# Patient Record
Sex: MALE | Race: WHITE | Employment: STUDENT | URBAN - METROPOLITAN AREA
[De-identification: names, ages, dates, MRNs, and addresses within clinical notes are randomized per-mention and may not be internally consistent; named-entity substitution may affect disease eponyms.]

---

## 2017-01-09 NOTE — PROGRESS NOTES
Vasyl Negrete is a 24year old male. HPI:   Patient presents for recheck of his ADHD. Patient has been using the stimulant medication, Adderall XR 10mg, on a regular basis. Patient was last seen 1 year ago.   Medication changes at that time:  Non shortness of breath with exertion  CARDIOVASCULAR: denies chest pain, denies palpitations  GI: denies abdominal pain  NEURO: denies headaches    EXAM:   /62 mmHg  Pulse 72  Temp(Src) 97.9 °F (36.6 °C) (Oral)  Resp 16  Ht 71\"  Wt 167 lb 12.8 oz  BMI

## 2017-04-17 PROBLEM — S92.354A CLOSED NONDISPLACED FRACTURE OF FIFTH METATARSAL BONE OF RIGHT FOOT, INITIAL ENCOUNTER: Status: ACTIVE | Noted: 2017-04-17

## 2017-05-22 ENCOUNTER — APPOINTMENT (OUTPATIENT)
Dept: PHYSICAL THERAPY | Facility: HOSPITAL | Age: 22
End: 2017-05-22
Attending: ORTHOPAEDIC SURGERY
Payer: COMMERCIAL

## 2017-05-24 ENCOUNTER — HOSPITAL ENCOUNTER (OUTPATIENT)
Dept: PHYSICAL THERAPY | Facility: HOSPITAL | Age: 22
Setting detail: THERAPIES SERIES
Discharge: HOME | End: 2017-05-24
Attending: ORTHOPAEDIC SURGERY
Payer: COMMERCIAL

## 2017-05-24 DIAGNOSIS — S92.354A CLOSED NONDISPLACED FRACTURE OF FIFTH RIGHT METATARSAL BONE: Primary | ICD-10-CM

## 2017-05-24 PROCEDURE — 97110 THERAPEUTIC EXERCISES: CPT

## 2017-05-24 PROCEDURE — 97140 MANUAL THERAPY 1/> REGIONS: CPT

## 2017-05-24 PROCEDURE — 97161 PT EVAL LOW COMPLEX 20 MIN: CPT

## 2017-05-24 NOTE — PROGRESS NOTES
LOWER EXTREMITY EVALUATION:   Referring Physician: Dr. Neri Pac  Diagnosis: R nondisplaced fracture of fifth metatarsal bone Date of Service: 5/24/2017     PATIENT SUMMARY   Esperanza Bruce is a 24year old y/o male who presents to therapy today with com weakness, muscle atrophy R calf, antalgic gait pattern, limping on R LE, impaired balance, and restricted R LE flexibility.    Aleah Nanas would benefit from skilled Physical Therapy to address the above impairments to overall functional mobility, strength, an 3.4, M/L 1.8; L overall stability 4.7, A/P 3.1, M/L 3.0; (normative data: overall stability 3.9, A/P 3.7, and M/L 1.8    Today’s Treatment and Response: Evaluation complete. Education of objective findings. Discussed plan of care. Issued HEP.     Evaluation care.    Thank you for your referral. Please co-sign or sign and return this letter via fax as soon as possible to [phone].  If you have any questions, please contact me at Dept: [phone]    Sincerely,  Electronically signed by therapist: Boby Meneses

## 2017-05-30 ENCOUNTER — HOSPITAL ENCOUNTER (OUTPATIENT)
Dept: PHYSICAL THERAPY | Facility: HOSPITAL | Age: 22
Setting detail: THERAPIES SERIES
Discharge: HOME | End: 2017-05-30
Attending: ORTHOPAEDIC SURGERY
Payer: COMMERCIAL

## 2017-05-30 PROCEDURE — 97110 THERAPEUTIC EXERCISES: CPT

## 2017-05-30 PROCEDURE — 97140 MANUAL THERAPY 1/> REGIONS: CPT

## 2017-05-30 NOTE — PROGRESS NOTES
Dx: R closed nondisplaced fracture of 5th metarsal         Authorized # of Visits:  8 visits         Next MD visit: none scheduled  Fall Risk: standard         Precautions: n/a             Subjective: Pt. Reports currently 0/10 pain in R foot.  Has been doi trunk rotation x 10         Foam beam   Tandem walk x 5  Side step x 5         Shuttle   Squat 4C x 30  R 3C x 15  B heel raises 3C x 30         sls with ski poles trunk rotation         MT to R forefoot and midfoot           Ice x 10 min         Bold exer

## 2017-06-05 ENCOUNTER — HOSPITAL ENCOUNTER (OUTPATIENT)
Dept: PHYSICAL THERAPY | Facility: HOSPITAL | Age: 22
Setting detail: THERAPIES SERIES
Discharge: HOME | End: 2017-06-05
Attending: ORTHOPAEDIC SURGERY
Payer: COMMERCIAL

## 2017-06-05 PROCEDURE — 97110 THERAPEUTIC EXERCISES: CPT

## 2017-06-05 PROCEDURE — 97140 MANUAL THERAPY 1/> REGIONS: CPT

## 2017-06-05 NOTE — PROGRESS NOTES
Dx: R closed nondisplaced fracture of 5th metarsal         Authorized # of Visits:  8 visits         Next MD visit: none scheduled  Fall Risk: standard         Precautions: n/a             Subjective: Pt. Reports currently 0/10 pain in R foot.  States he no Date:   Tx#: 5/ Date: Tx#: 6/ Date: Tx#: 7/ Date:    Tx#: 8/   Bike x 5 min Bike x 5 min        A/P board x 20 A/P board x 20  M/L x 20        White board gastroc stretch 10 sec x 10 White board gastroc stretch 10 sec x 10        prostretch 10 sec x 10

## 2017-06-07 ENCOUNTER — HOSPITAL ENCOUNTER (OUTPATIENT)
Dept: PHYSICAL THERAPY | Facility: HOSPITAL | Age: 22
Setting detail: THERAPIES SERIES
Discharge: HOME | End: 2017-06-07
Attending: ORTHOPAEDIC SURGERY
Payer: COMMERCIAL

## 2017-06-07 PROCEDURE — 97140 MANUAL THERAPY 1/> REGIONS: CPT

## 2017-06-07 PROCEDURE — 97110 THERAPEUTIC EXERCISES: CPT

## 2017-06-07 NOTE — PROGRESS NOTES
Dx: R closed nondisplaced fracture of 5th metarsal         Authorized # of Visits:  8 visits         Next MD visit: none scheduled  Fall Risk: standard         Precautions: n/a             Subjective: Pt. Reports currently 0/10 pain in R foot.  Ingrown toe 20  M/L x 20 A/P board x 20   M/L x 20       White board gastroc stretch 10 sec x 10 White board gastroc stretch 10 sec x 10 White board gastroc stretch 10 sec x 10       prostretch 10 sec x 10 prostretch 10 sec x 10 prostretch 10 sec x 10       bosu ball

## 2017-06-12 ENCOUNTER — APPOINTMENT (OUTPATIENT)
Dept: PHYSICAL THERAPY | Facility: HOSPITAL | Age: 22
End: 2017-06-12
Attending: ORTHOPAEDIC SURGERY
Payer: COMMERCIAL

## 2017-06-14 ENCOUNTER — APPOINTMENT (OUTPATIENT)
Dept: PHYSICAL THERAPY | Facility: HOSPITAL | Age: 22
End: 2017-06-14
Attending: ORTHOPAEDIC SURGERY
Payer: COMMERCIAL

## 2017-08-11 ENCOUNTER — OFFICE VISIT (OUTPATIENT)
Dept: FAMILY MEDICINE CLINIC | Facility: CLINIC | Age: 22
End: 2017-08-11

## 2017-08-11 VITALS
HEIGHT: 71.5 IN | HEART RATE: 72 BPM | SYSTOLIC BLOOD PRESSURE: 100 MMHG | DIASTOLIC BLOOD PRESSURE: 60 MMHG | WEIGHT: 171 LBS | BODY MASS INDEX: 23.42 KG/M2 | TEMPERATURE: 98 F

## 2017-08-11 DIAGNOSIS — Z00.00 ANNUAL PHYSICAL EXAM: Primary | ICD-10-CM

## 2017-08-11 DIAGNOSIS — F90.0 ATTENTION DEFICIT HYPERACTIVITY DISORDER (ADHD), PREDOMINANTLY INATTENTIVE TYPE: ICD-10-CM

## 2017-08-11 PROCEDURE — 99395 PREV VISIT EST AGE 18-39: CPT | Performed by: FAMILY MEDICINE

## 2017-08-11 RX ORDER — DEXTROAMPHETAMINE SACCHARATE, AMPHETAMINE ASPARTATE MONOHYDRATE, DEXTROAMPHETAMINE SULFATE AND AMPHETAMINE SULFATE 2.5; 2.5; 2.5; 2.5 MG/1; MG/1; MG/1; MG/1
10 CAPSULE, EXTENDED RELEASE ORAL DAILY
Qty: 30 CAPSULE | Refills: 0 | Status: SHIPPED | OUTPATIENT
Start: 2017-09-10 | End: 2017-10-10

## 2017-08-11 RX ORDER — DEXTROAMPHETAMINE SACCHARATE, AMPHETAMINE ASPARTATE MONOHYDRATE, DEXTROAMPHETAMINE SULFATE AND AMPHETAMINE SULFATE 2.5; 2.5; 2.5; 2.5 MG/1; MG/1; MG/1; MG/1
10 CAPSULE, EXTENDED RELEASE ORAL DAILY
Qty: 30 CAPSULE | Refills: 0 | Status: SHIPPED | OUTPATIENT
Start: 2017-10-10 | End: 2017-11-09

## 2017-08-11 RX ORDER — DEXTROAMPHETAMINE SACCHARATE, AMPHETAMINE ASPARTATE MONOHYDRATE, DEXTROAMPHETAMINE SULFATE AND AMPHETAMINE SULFATE 2.5; 2.5; 2.5; 2.5 MG/1; MG/1; MG/1; MG/1
10 CAPSULE, EXTENDED RELEASE ORAL DAILY
Qty: 30 CAPSULE | Refills: 0 | Status: SHIPPED | OUTPATIENT
Start: 2017-08-11 | End: 2017-09-10

## 2017-08-11 NOTE — PROGRESS NOTES
Patient presents with:  ADD: med check refill on adderall     HPI:   Ector Najjar is a 25year old male who presents for a complete physical exam.     Last colonoscopy:  n/a  Last PSA:  n/a  Immunizations: TDaP 6/2014.       School at [employer] Diabetes Maternal Grandmother       Social History:  Smoking status: Never Smoker                                                              Smokeless tobacco: Former User                     Alcohol use: Yes           1.8 - 2.4 oz/week     Standard drin (ADDERALL XR) 10 MG Oral Capsule SR 24 Hr; Take 1 capsule (10 mg total) by mouth daily. Dispense: 30 capsule; Refill: 0  - Amphetamine-Dextroamphet ER (ADDERALL XR) 10 MG Oral Capsule SR 24 Hr; Take 1 capsule (10 mg total) by mouth daily.   Dispense: 30 ca

## 2017-09-19 ENCOUNTER — TELEPHONE (OUTPATIENT)
Dept: FAMILY MEDICINE CLINIC | Facility: CLINIC | Age: 22
End: 2017-09-19

## 2017-09-19 NOTE — TELEPHONE ENCOUNTER
Patient is calling he needs a note for the school on why he is prescribed Adderrall. He said it is because it affects his school work. He needs letter today or tomorrow. Will call back with a fax number to fax it too.

## 2017-11-22 ENCOUNTER — TELEPHONE (OUTPATIENT)
Dept: FAMILY MEDICINE CLINIC | Facility: CLINIC | Age: 22
End: 2017-11-22

## 2017-11-22 NOTE — TELEPHONE ENCOUNTER
Patient's mom states adderall prescription  needs at least one medication sent to [address].

## 2017-12-20 NOTE — PROGRESS NOTES
Patient presents with:  ADD: refill on Adderall     HPI:   Leslie Vega is a 25year old male who presents for recheck of his ADHD. Pt is taking Adderall XR 10mg. This dose is effective  Takes historically 2-3 times a week.     Not experiencing any Oral Capsule SR 24 Hr; Take 1 capsule (10 mg total) by mouth daily. Dispense: 30 capsule; Refill: 0  - Amphetamine-Dextroamphet ER (ADDERALL XR) 10 MG Oral Capsule SR 24 Hr; Take 1 capsule (10 mg total) by mouth daily. Dispense: 30 capsule;  Refill: 0  -

## 2018-12-27 ENCOUNTER — APPOINTMENT (OUTPATIENT)
Dept: CT IMAGING | Facility: HOSPITAL | Age: 23
End: 2018-12-27
Attending: EMERGENCY MEDICINE

## 2018-12-27 ENCOUNTER — HOSPITAL ENCOUNTER (EMERGENCY)
Facility: HOSPITAL | Age: 23
Discharge: HOME | End: 2018-12-27
Attending: EMERGENCY MEDICINE

## 2018-12-27 VITALS
HEIGHT: 71 IN | OXYGEN SATURATION: 98 % | RESPIRATION RATE: 18 BRPM | WEIGHT: 180 LBS | DIASTOLIC BLOOD PRESSURE: 63 MMHG | HEART RATE: 61 BPM | SYSTOLIC BLOOD PRESSURE: 120 MMHG | BODY MASS INDEX: 25.2 KG/M2 | TEMPERATURE: 98 F

## 2018-12-27 DIAGNOSIS — R10.9 FLANK PAIN: Primary | ICD-10-CM

## 2018-12-27 DIAGNOSIS — N20.0 KIDNEY STONE: ICD-10-CM

## 2018-12-27 PROCEDURE — 85025 COMPLETE CBC W/AUTO DIFF WBC: CPT | Performed by: EMERGENCY MEDICINE

## 2018-12-27 PROCEDURE — 36415 COLL VENOUS BLD VENIPUNCTURE: CPT

## 2018-12-27 PROCEDURE — 80053 COMPREHEN METABOLIC PANEL: CPT | Performed by: EMERGENCY MEDICINE

## 2018-12-27 PROCEDURE — 81003 URINALYSIS AUTO W/O SCOPE: CPT | Performed by: EMERGENCY MEDICINE

## 2018-12-27 PROCEDURE — 74176 CT ABD & PELVIS W/O CONTRAST: CPT | Performed by: EMERGENCY MEDICINE

## 2018-12-27 PROCEDURE — 99284 EMERGENCY DEPT VISIT MOD MDM: CPT

## 2018-12-28 NOTE — ED INITIAL ASSESSMENT (HPI)
21year old male here with RLQ, middle abdomen pain. Pain started an hour ago after patient ate portillos. Patient stated he felt nauseous, no vomiting. Patient denies fevers, diarrhea.

## 2018-12-28 NOTE — ED PROVIDER NOTES
Patient Seen in: [facility] Emergency Department    History   Patient presents with:  Abdomen/Flank Pain (GI/)    Stated Complaint: abd pain    HPI      19-year-old male presents to the emergency department with acute onset right lower quadrant pa distress  Heent:  Normocephalic, atraumatic. PERRL. EOMI  CVS:  RRR  Lungs:  CTA  Abd:  Soft, nondistended, nontender in RLQ. No guarding or rebound. Ext:  No clubbing,cyanosis, edema  Neuro:  CN 2-12 intact. No focal deficit.     ED Course     Labs Rev Patient had an IV established and labs were drawn. Patient continued to be observed here in the emergency department. Patient remained stable throughout the emergency department observation period. Patient was reassessed and currently pain-free.

## 2019-04-15 ENCOUNTER — TELEPHONE (OUTPATIENT)
Dept: FAMILY MEDICINE CLINIC | Facility: CLINIC | Age: 24
End: 2019-04-15

## 2019-04-15 RX ORDER — DEXTROAMPHETAMINE SACCHARATE, AMPHETAMINE ASPARTATE MONOHYDRATE, DEXTROAMPHETAMINE SULFATE AND AMPHETAMINE SULFATE 2.5; 2.5; 2.5; 2.5 MG/1; MG/1; MG/1; MG/1
10 CAPSULE, EXTENDED RELEASE ORAL DAILY
Qty: 30 CAPSULE | Refills: 0 | Status: SHIPPED | OUTPATIENT
Start: 2019-04-15 | End: 2019-05-15

## 2019-04-15 NOTE — TELEPHONE ENCOUNTER
Called and talked to mother if Dr Sofi Lee is willing to write the script she will pick it up and fill it then overnight it to him at school

## 2019-04-15 NOTE — TELEPHONE ENCOUNTER
Patient is away at school and is in need of his Amphetamine-Dextroamphet ER (Adderral). He is completely out and he is starting finals and will be back in May. Patient scheduled for 1:30 pm on May 17, 2019.  If you need to call Lena Morel' cell [phone]

## 2019-05-16 NOTE — H&P
[facility]    PATIENT'S NAME: Ok Steward   ATTENDING PHYSICIAN: Monica Dent M.D.    PATIENT ACCOUNT#:   [de-identified]    LOCATION:    MEDICAL RECORD #:   [id number]       YOB: 1995  ADMISSION DATE:       05/18/2019    HISTORY A over the extensor tendons at the level of the carpus and metacarpals, with a contracture of the extension of the digits. The carpus was stable. There was no erythema. The patient was diagnosed as having a bone spur.   Dr. Cathy Stringer recommended not only reex thickening of the extensor tendons over the dorsal aspect of left hand and wrist.  Patient has good motion and strength. He is neurovascularly intact. IMPRESSION:  Recurrent tenosynovitis.      PLAN:  Left hand and wrist dorsal tenosynovectomy with poss

## 2019-05-16 NOTE — H&P (VIEW-ONLY)
HISTORY AND PHYSICAL EXAMINATION    DATE: 05/16/2019  [facility]     HISTORY OF PRESENT ILLNESS:  This is a 63-year-old male who is scheduled to undergo a left wrist extensor tenosynovectomy by myself at [facility] on 05/18/2019.  The patient unders of the mass. He felt the patient had a tenosynovitis and recommended release of the first dorsal compartment at the time of surgery. He recommended surgery to be done under Annmarie block. The patient elected not to have the surgery at that time.  The patient release of first dorsal compartment and exploration for a carpal boss under general anesthesia as a same-day surgery patient at [facility] on 05/18/2019. The patient understands the procedure. The patient does wish to proceed.  The patient has no medic

## 2019-05-17 ENCOUNTER — OFFICE VISIT (OUTPATIENT)
Dept: FAMILY MEDICINE CLINIC | Facility: CLINIC | Age: 24
End: 2019-05-17
Payer: COMMERCIAL

## 2019-05-17 VITALS
HEART RATE: 72 BPM | WEIGHT: 171.19 LBS | SYSTOLIC BLOOD PRESSURE: 102 MMHG | TEMPERATURE: 98 F | DIASTOLIC BLOOD PRESSURE: 60 MMHG | BODY MASS INDEX: 23.97 KG/M2 | HEIGHT: 70.75 IN

## 2019-05-17 DIAGNOSIS — F90.0 ATTENTION DEFICIT HYPERACTIVITY DISORDER (ADHD), PREDOMINANTLY INATTENTIVE TYPE: ICD-10-CM

## 2019-05-17 DIAGNOSIS — Z00.00 ANNUAL PHYSICAL EXAM: Primary | ICD-10-CM

## 2019-05-17 PROCEDURE — 99395 PREV VISIT EST AGE 18-39: CPT | Performed by: FAMILY MEDICINE

## 2019-05-17 RX ORDER — DEXTROAMPHETAMINE SACCHARATE, AMPHETAMINE ASPARTATE MONOHYDRATE, DEXTROAMPHETAMINE SULFATE AND AMPHETAMINE SULFATE 2.5; 2.5; 2.5; 2.5 MG/1; MG/1; MG/1; MG/1
10 CAPSULE, EXTENDED RELEASE ORAL DAILY
Qty: 30 CAPSULE | Refills: 0 | Status: SHIPPED | OUTPATIENT
Start: 2019-05-17 | End: 2019-06-16

## 2019-05-17 RX ORDER — DEXTROAMPHETAMINE SACCHARATE, AMPHETAMINE ASPARTATE MONOHYDRATE, DEXTROAMPHETAMINE SULFATE AND AMPHETAMINE SULFATE 2.5; 2.5; 2.5; 2.5 MG/1; MG/1; MG/1; MG/1
10 CAPSULE, EXTENDED RELEASE ORAL DAILY
Qty: 30 CAPSULE | Refills: 0 | Status: SHIPPED | OUTPATIENT
Start: 2019-06-16 | End: 2019-07-16

## 2019-05-17 RX ORDER — DEXTROAMPHETAMINE SACCHARATE, AMPHETAMINE ASPARTATE MONOHYDRATE, DEXTROAMPHETAMINE SULFATE AND AMPHETAMINE SULFATE 2.5; 2.5; 2.5; 2.5 MG/1; MG/1; MG/1; MG/1
10 CAPSULE, EXTENDED RELEASE ORAL DAILY
Qty: 30 CAPSULE | Refills: 0 | Status: SHIPPED | OUTPATIENT
Start: 2019-07-16 | End: 2019-08-15

## 2019-05-17 RX ORDER — DEXTROAMPHETAMINE SACCHARATE, AMPHETAMINE ASPARTATE MONOHYDRATE, DEXTROAMPHETAMINE SULFATE AND AMPHETAMINE SULFATE 2.5; 2.5; 2.5; 2.5 MG/1; MG/1; MG/1; MG/1
10 CAPSULE, EXTENDED RELEASE ORAL EVERY MORNING
COMMUNITY

## 2019-05-17 NOTE — PROGRESS NOTES
Patient presents with:  ADD: Adderall refill     HPI:   Sharon Mckay is a 21year old male who presents for a complete physical exam.     Last colonoscopy:  N/a - at 48  Last PSA:  N/a - at 48  Immunizations: TDaP 2014. Other shots UTD.      Wrist - ARTHROSCOP,PART SYNOVECT Left    • WRIST GANGLION CYST EXCISION Left 8/7/2015    Performed by Evan Pak MD at [facility] MAIN OR      Family History   Problem Relation Age of Onset   • Colon Cancer Paternal Grandfather    • Breast Cancer Paternal Grandmother coordination and gait    ASSESSMENT AND PLAN:     Sudeep Rice was seen in the office today:  had concerns including ADD (Adderall refill). 1. Annual physical exam  Overall healthy  Upcoming surgery tomorrow for wrist    2.  Attention deficit hype [x]  f. Often avoids, dislikes, or is reluctant to engage in tasks that require sustained mental effort (such as schoolwork or homework)               []  g.  Often loses things necessary for tasks or activities (e.g., toys, school assignments, Dian Fleischer schizophrenia, or other psychotic disorder and are not better accounted for by another mental disorder (e.g., mood disorder, anxiety disorder, dissociative disorder, or a personality disorder).

## 2019-05-18 ENCOUNTER — ANESTHESIA EVENT (OUTPATIENT)
Dept: SURGERY | Facility: HOSPITAL | Age: 24
End: 2019-05-18
Payer: COMMERCIAL

## 2019-05-18 ENCOUNTER — HOSPITAL ENCOUNTER (OUTPATIENT)
Facility: HOSPITAL | Age: 24
Setting detail: HOSPITAL OUTPATIENT SURGERY
Discharge: HOME | End: 2019-05-18
Attending: ORTHOPAEDIC SURGERY | Admitting: ORTHOPAEDIC SURGERY
Payer: COMMERCIAL

## 2019-05-18 ENCOUNTER — ANESTHESIA (OUTPATIENT)
Dept: SURGERY | Facility: HOSPITAL | Age: 24
End: 2019-05-18
Payer: COMMERCIAL

## 2019-05-18 VITALS
DIASTOLIC BLOOD PRESSURE: 64 MMHG | OXYGEN SATURATION: 99 % | HEART RATE: 66 BPM | RESPIRATION RATE: 16 BRPM | TEMPERATURE: 98 F | WEIGHT: 170.75 LBS | SYSTOLIC BLOOD PRESSURE: 122 MMHG | HEIGHT: 71 IN | BODY MASS INDEX: 23.9 KG/M2

## 2019-05-18 DIAGNOSIS — M77.8: ICD-10-CM

## 2019-05-18 PROCEDURE — 0LB60ZZ EXCISION OF LEFT LOWER ARM AND WRIST TENDON, OPEN APPROACH: ICD-10-PCS | Performed by: ORTHOPAEDIC SURGERY

## 2019-05-18 RX ORDER — HYDROCODONE BITARTRATE AND ACETAMINOPHEN 5; 325 MG/1; MG/1
1 TABLET ORAL EVERY 6 HOURS PRN
Qty: 20 TABLET | Refills: 0 | Status: SHIPPED | OUTPATIENT
Start: 2019-05-18 | End: 2020-12-21

## 2019-05-18 RX ORDER — BUPIVACAINE HYDROCHLORIDE 5 MG/ML
INJECTION, SOLUTION EPIDURAL; INTRACAUDAL AS NEEDED
Status: DISCONTINUED | OUTPATIENT
Start: 2019-05-18 | End: 2019-05-18

## 2019-05-18 RX ORDER — SODIUM CHLORIDE, SODIUM LACTATE, POTASSIUM CHLORIDE, CALCIUM CHLORIDE 600; 310; 30; 20 MG/100ML; MG/100ML; MG/100ML; MG/100ML
INJECTION, SOLUTION INTRAVENOUS CONTINUOUS
Status: DISCONTINUED | OUTPATIENT
Start: 2019-05-18 | End: 2019-05-18

## 2019-05-18 RX ORDER — HYDROCODONE BITARTRATE AND ACETAMINOPHEN 5; 325 MG/1; MG/1
1 TABLET ORAL AS NEEDED
Status: COMPLETED | OUTPATIENT
Start: 2019-05-18 | End: 2019-05-18

## 2019-05-18 RX ORDER — HYDROMORPHONE HYDROCHLORIDE 1 MG/ML
INJECTION, SOLUTION INTRAMUSCULAR; INTRAVENOUS; SUBCUTANEOUS
Status: COMPLETED
Start: 2019-05-18 | End: 2019-05-18

## 2019-05-18 RX ORDER — NALOXONE HYDROCHLORIDE 0.4 MG/ML
80 INJECTION, SOLUTION INTRAMUSCULAR; INTRAVENOUS; SUBCUTANEOUS AS NEEDED
Status: DISCONTINUED | OUTPATIENT
Start: 2019-05-18 | End: 2019-05-18

## 2019-05-18 RX ORDER — ONDANSETRON 2 MG/ML
4 INJECTION INTRAMUSCULAR; INTRAVENOUS AS NEEDED
Status: DISCONTINUED | OUTPATIENT
Start: 2019-05-18 | End: 2019-05-18

## 2019-05-18 RX ORDER — DIPHENHYDRAMINE HYDROCHLORIDE 50 MG/ML
12.5 INJECTION INTRAMUSCULAR; INTRAVENOUS AS NEEDED
Status: DISCONTINUED | OUTPATIENT
Start: 2019-05-18 | End: 2019-05-18

## 2019-05-18 RX ORDER — ACETAMINOPHEN 500 MG
1000 TABLET ORAL EVERY 6 HOURS PRN
COMMUNITY

## 2019-05-18 RX ORDER — HYDROCODONE BITARTRATE AND ACETAMINOPHEN 5; 325 MG/1; MG/1
2 TABLET ORAL AS NEEDED
Status: COMPLETED | OUTPATIENT
Start: 2019-05-18 | End: 2019-05-18

## 2019-05-18 RX ORDER — HYDROMORPHONE HYDROCHLORIDE 1 MG/ML
0.4 INJECTION, SOLUTION INTRAMUSCULAR; INTRAVENOUS; SUBCUTANEOUS EVERY 5 MIN PRN
Status: DISCONTINUED | OUTPATIENT
Start: 2019-05-18 | End: 2019-05-18

## 2019-05-18 RX ORDER — CLINDAMYCIN PHOSPHATE 900 MG/50ML
900 INJECTION INTRAVENOUS ONCE
Status: COMPLETED | OUTPATIENT
Start: 2019-05-18 | End: 2019-05-18

## 2019-05-18 RX ORDER — CLINDAMYCIN PHOSPHATE 900 MG/50ML
INJECTION INTRAVENOUS
Status: DISCONTINUED
Start: 2019-05-18 | End: 2019-05-18

## 2019-05-18 RX ORDER — ACETAMINOPHEN 500 MG
1000 TABLET ORAL ONCE
Status: DISCONTINUED | OUTPATIENT
Start: 2019-05-18 | End: 2019-05-18

## 2019-05-18 RX ORDER — MIDAZOLAM HYDROCHLORIDE 1 MG/ML
1 INJECTION INTRAMUSCULAR; INTRAVENOUS EVERY 5 MIN PRN
Status: DISCONTINUED | OUTPATIENT
Start: 2019-05-18 | End: 2019-05-18

## 2019-05-18 RX ORDER — MEPERIDINE HYDROCHLORIDE 25 MG/ML
12.5 INJECTION INTRAMUSCULAR; INTRAVENOUS; SUBCUTANEOUS AS NEEDED
Status: DISCONTINUED | OUTPATIENT
Start: 2019-05-18 | End: 2019-05-18

## 2019-05-18 RX ORDER — ACETAMINOPHEN 500 MG
1000 TABLET ORAL ONCE AS NEEDED
Status: DISCONTINUED | OUTPATIENT
Start: 2019-05-18 | End: 2019-05-18

## 2019-05-18 NOTE — ANESTHESIA POSTPROCEDURE EVALUATION
[facility] Patient Status:  Hospital Outpatient Surgery   Age/Gender 21year old male MRN [id number]   [facility] SURGERY Attending Pat Santos MD   Hosp Day # 0 PCP [facility] MD       Anesthesia Post-op

## 2019-05-18 NOTE — ANESTHESIA PREPROCEDURE EVALUATION
PRE-OP EVALUATION    Patient Name: Lew Gunter    Pre-op Diagnosis: Enthesopathy of left wrist [M77.8]    Procedure(s):  EXTENSOR TENOSYNOVECTOMY LEFT WRIST    Surgeon(s) and Role:     Elizabeth Cuenca MD - Primary    Pre-op vitals reviewed.   Temp Pulmonary    Pulmonary exam normal.                 Other findings            ASA: 1   Plan: general  NPO status verified and     Post-procedure pain management plan discussed with surgeon and patient.       Plan/risks discussed with: patient

## 2019-05-18 NOTE — INTERVAL H&P NOTE
Pre-op Diagnosis: Enthesopathy of left wrist [M77.8]    The above referenced H&P was reviewed by Kip George MD on 5/18/2019, the patient was examined and no significant changes have occurred in the patient's condition since the H&P was performed.   I di

## 2019-05-18 NOTE — BRIEF OP NOTE
Pre-Operative Diagnosis: Enthesopathy of left wrist [M77.8]     Post-Operative Diagnosis: * No post-op diagnosis entered *      Procedure Performed:   Procedure(s):  EXTENSOR TENOSYNOVECTOMY LEFT WRIST    Surgeon(s) and Role:     Sunday Lester MD - Valentine Ty

## 2019-05-18 NOTE — INTERVAL H&P NOTE
Pre-op Diagnosis: Enthesopathy of left wrist [M77.8]    The above referenced H&P was reviewed by Michael Gomez MD on 5/18/2019, the patient was examined and no significant changes have occurred in the patient's condition since the H&P was performed.   I di

## 2019-05-20 PROBLEM — Z47.89 ORTHOPEDIC AFTERCARE: Status: ACTIVE | Noted: 2019-05-20

## 2019-05-20 NOTE — OPERATIVE REPORT
[address]    PATIENT'S NAME: Richard Anum   ATTENDING PHYSICIAN: Lilibeth Sims M.D. OPERATING PHYSICIAN: Lilibeth Sims M.D.    PATIENT ACCOUNT#:   [de-identified]    LOCATION:  [address] [place] [address] EDW 10  MEDICAL RECORD #:   [id number] Vicryl sutures. The skin was closed with interrupted 4-0 nylon suture; 10 mL of 0.5% Sensorcaine without epinephrine was injected locally. Adaptic and a sterile dressing was applied. Tourniquet was released after 19 minutes.  There was good capillary ref

## 2020-11-23 ENCOUNTER — TELEPHONE (OUTPATIENT)
Dept: FAMILY MEDICINE CLINIC | Facility: CLINIC | Age: 25
End: 2020-11-23

## 2020-11-23 DIAGNOSIS — Z13.220 LIPID SCREENING: Primary | ICD-10-CM

## 2020-11-23 NOTE — TELEPHONE ENCOUNTER
Please enter lab orders for the patient's upcoming physical appointment. Physical scheduled:    Your appointments     Date & Time Appointment Department [facility])    Dec 21, 2020  2:30 PM CST Physical - Established with MD Celsa Nick

## 2020-11-24 NOTE — TELEPHONE ENCOUNTER
If he cannot get, no big deal.  Labs have been ordered. Please get them fasting prior to the appt.   thanks

## 2020-12-21 ENCOUNTER — OFFICE VISIT (OUTPATIENT)
Dept: FAMILY MEDICINE CLINIC | Facility: CLINIC | Age: 25
End: 2020-12-21
Payer: COMMERCIAL

## 2020-12-21 VITALS
WEIGHT: 173.81 LBS | TEMPERATURE: 98 F | DIASTOLIC BLOOD PRESSURE: 64 MMHG | SYSTOLIC BLOOD PRESSURE: 118 MMHG | BODY MASS INDEX: 24.33 KG/M2 | HEART RATE: 72 BPM | HEIGHT: 71 IN | RESPIRATION RATE: 16 BRPM

## 2020-12-21 DIAGNOSIS — Z00.00 ANNUAL PHYSICAL EXAM: Primary | ICD-10-CM

## 2020-12-21 DIAGNOSIS — R10.32 LEFT LOWER QUADRANT ABDOMINAL PAIN: ICD-10-CM

## 2020-12-21 DIAGNOSIS — M54.6 MIDLINE THORACIC BACK PAIN, UNSPECIFIED CHRONICITY: ICD-10-CM

## 2020-12-21 PROCEDURE — 3078F DIAST BP <80 MM HG: CPT | Performed by: FAMILY MEDICINE

## 2020-12-21 PROCEDURE — 3074F SYST BP LT 130 MM HG: CPT | Performed by: FAMILY MEDICINE

## 2020-12-21 PROCEDURE — 99395 PREV VISIT EST AGE 18-39: CPT | Performed by: FAMILY MEDICINE

## 2020-12-21 PROCEDURE — 3008F BODY MASS INDEX DOCD: CPT | Performed by: FAMILY MEDICINE

## 2020-12-21 PROCEDURE — 90471 IMMUNIZATION ADMIN: CPT | Performed by: FAMILY MEDICINE

## 2020-12-21 PROCEDURE — 90686 IIV4 VACC NO PRSV 0.5 ML IM: CPT | Performed by: FAMILY MEDICINE

## 2020-12-21 RX ORDER — FAMOTIDINE 20 MG/1
20 TABLET ORAL NIGHTLY PRN
Qty: 30 TABLET | Refills: 1 | Status: SHIPPED | OUTPATIENT
Start: 2020-12-21

## 2020-12-21 NOTE — PROGRESS NOTES
Patient presents with:  Physical: Annual  Immunization/Injection: Flu Vaccine     HPI:   Meredith Covert is a 22year old male who presents for a complete physical exam.     Last colonoscopy:  N/a- at 48  Last PSA:  N/a - at 50  Immunizations: flu toda Past Surgical History:   Procedure Laterality Date   • HC IMPLANT EAR TUBES  1996?    • TEAR DUCT SYSTEM SURG UNLISTED      at 3years old   • TONSILLECTOMY     • WISDOM TEETH REMOVED     • WRIST ARTHROSCOP,PART SYNOVECT Left    • WRIST GANGLION CYST EXCI symmetric, no tenderness/mass/nodules  Lungs: clear to auscultation bilaterally  Heart: S1, S2 normal, no murmur, click, rub or gallop, regular rate and rhythm  Abdomen: soft, non-tender; bowel sounds normal; no masses,  no organomegaly  Extremities: extre

## 2022-05-13 ENCOUNTER — PATIENT MESSAGE (OUTPATIENT)
Dept: FAMILY MEDICINE CLINIC | Facility: CLINIC | Age: 27
End: 2022-05-13

## 2022-05-13 NOTE — TELEPHONE ENCOUNTER
From: Denisse Wilkinson  To: Maryjane Ortiz MD  Sent: 5/13/2022 9:40 AM CDT  Subject: Medical Records     This is the authorization form for transfer of medical records. I would like it faxed over to [facility].  Location and information is on form    Thank you,  Nicl

## 2022-05-16 ENCOUNTER — OFFICE VISIT (OUTPATIENT)
Dept: FAMILY MEDICINE CLINIC | Facility: CLINIC | Age: 27
End: 2022-05-16

## 2022-05-16 VITALS
OXYGEN SATURATION: 99 % | SYSTOLIC BLOOD PRESSURE: 116 MMHG | DIASTOLIC BLOOD PRESSURE: 72 MMHG | WEIGHT: 171 LBS | HEIGHT: 71 IN | BODY MASS INDEX: 23.94 KG/M2 | HEART RATE: 80 BPM

## 2022-05-16 DIAGNOSIS — J40 BRONCHITIS: ICD-10-CM

## 2022-05-16 DIAGNOSIS — Z80.0 FAMILY HISTORY OF COLORECTAL CANCER: ICD-10-CM

## 2022-05-16 DIAGNOSIS — J30.2 SEASONAL ALLERGIC RHINITIS, UNSPECIFIED TRIGGER: ICD-10-CM

## 2022-05-16 DIAGNOSIS — Z00.00 ENCOUNTER FOR MEDICAL EXAMINATION TO ESTABLISH CARE: Primary | ICD-10-CM

## 2022-05-16 DIAGNOSIS — M54.50 ACUTE BILATERAL LOW BACK PAIN WITHOUT SCIATICA: ICD-10-CM

## 2022-05-16 PROCEDURE — 99204 OFFICE O/P NEW MOD 45 MIN: CPT | Performed by: STUDENT IN AN ORGANIZED HEALTH CARE EDUCATION/TRAINING PROGRAM

## 2022-05-16 RX ORDER — CYCLOBENZAPRINE HCL 10 MG
10 TABLET ORAL 3 TIMES DAILY PRN
Qty: 30 TABLET | Refills: 0 | Status: SHIPPED | OUTPATIENT
Start: 2022-05-16 | End: 2022-12-05

## 2022-05-16 RX ORDER — DOXYCYCLINE HYCLATE 100 MG/1
100 CAPSULE ORAL 2 TIMES DAILY
Qty: 14 CAPSULE | Refills: 0 | OUTPATIENT
Start: 2022-05-16 | End: 2022-07-02

## 2022-05-16 NOTE — PROGRESS NOTES
New Patient Office Visit      Patient Name: Marshal Kumar  : 1995   MRN: [id number]   Care Team: Patient Care Team:  Luci Casper DO as PCP - General (Internal Medicine)    Chief Complaint:    Chief Complaint   Patient presents with   • Establish Care   • Cough     Ongoing for a week, green mucus        History of Present Illness: Marshal Kumar is a 26 y.o. male who is here today to establish care. Previously from [place], [place] and moved to [place] for school years ago. Now working at [employer]. Overall healthy, denies daily medications.     Family history of colon caner - father was diagnosed with colon cancer at age 62. Patient is concerned with this history and would like early colonoscopy screening. Wants referral to GI to discuss further. Denies hematochezia, diarrhea, constipation, abdominal pain, abnormal weight loss, bloating.     Cough - ongoing for about 1-2 week. Started with runny nose, dry cough and has progressed to productive cough. Sinus congestion/runny nose has resolved. Denies fever, sore throat, ear pain, nausea, vomiting. Has been taking Mucinex DM without cough relief.    Low back pain - ongoing for about 1-2 week, admits to sneezing and feeling pull in back. Otherwise no known injury. He live active lifestyle and enjoys lifting weights. Describes pain as tightness and initially limiting range of motion (bending) but over the past week his ROM has improved. Taking ibuprofen which helps temporarily.     Family History  Father with colorectal cancer (age 62)  Denies family history of HTN, T2DM, VTE, CVA     Subjective      Review of Systems:   Review of Systems - See HPI    Past Medical History: History reviewed. No pertinent past medical history.    Past Surgical History: History reviewed. No pertinent surgical history.    Family History:   Family History   Problem Relation Age of Onset   • Cancer Father         Currently diagnosed with colon cancer   • Miscarriages /  "Stillbirths Mother         Miscarriage       Social History:   Social History     Socioeconomic History   • Marital status: Single   Tobacco Use   • Smoking status: Current Some Day Smoker     Types: Cigarettes   • Smokeless tobacco: Never Used   Substance and Sexual Activity   • Alcohol use: Yes     Alcohol/week: 5.0 standard drinks     Types: 1 Glasses of wine, 3 Cans of beer, 1 Shots of liquor per week     Comment: A normal amount for a 26th year old   • Drug use: Never   • Sexual activity: Yes     Partners: Female       Tobacco History:   Social History     Tobacco Use   Smoking Status Current Some Day Smoker   • Types: Cigarettes   Smokeless Tobacco Never Used       Medications:     Current Outpatient Medications:   •  cyclobenzaprine (FLEXERIL) 10 MG tablet, Take 1 tablet by mouth 3 (Three) Times a Day As Needed for Muscle Spasms., Disp: 30 tablet, Rfl: 0  •  doxycycline (VIBRAMYCIN) 100 MG capsule, Take 1 capsule by mouth 2 (Two) Times a Day., Disp: 14 capsule, Rfl: 0    Allergies:   Allergies   Allergen Reactions   • Sulfa Antibiotics Unknown - Low Severity   • Amoxicillin Hives       Objective     Physical Exam:  Vital Signs:   Vitals:    05/16/22 1051   BP: 116/72   Pulse: 80   SpO2: 99%   Weight: 77.6 kg (171 lb)   Height: 180.3 cm (71\")     Body mass index is 23.85 kg/m².     Physical Exam  Vitals reviewed.   Constitutional:       Appearance: Normal appearance.   Cardiovascular:      Rate and Rhythm: Normal rate.      Pulses: Normal pulses.   Pulmonary:      Effort: Pulmonary effort is normal. No respiratory distress.      Breath sounds: Normal breath sounds. No wheezing, rhonchi or rales.   Musculoskeletal:         General: No swelling or deformity. Normal range of motion.      Comments: No spinal tenderness or tenderness to palpitation of paraspinal muscles.    Skin:     General: Skin is warm and dry.   Neurological:      Mental Status: He is alert.      Gait: Gait normal.   Psychiatric:         Mood " and Affect: Mood normal.         Behavior: Behavior normal.         Judgment: Judgment normal.         Assessment / Plan      Assessment/Plan:   Problems Addressed This Visit  Diagnoses and all orders for this visit:    1. Encounter for medical examination to establish care (Primary)  Discussed importance of preventative care including vaccinations, age appropriate cancer screening, routine lab work, healthy diet, and active lifestyle.    2. Family history of colorectal cancer  -     Ambulatory Referral to Gastroenterology  Father was diagnosed at age 62. We discussed typical screening for high risk individuals with FDR of colorectal cancer being 10 years prior to diagnosis or at least age 40.  Currently he does not have any symptoms but would like to discuss with GI to see if he is a candidate for earlier screening. Referred today.    3. Acute bilateral low back pain without sciatica  -     cyclobenzaprine (FLEXERIL) 10 MG tablet; Take 1 tablet by mouth 3 (Three) Times a Day As Needed for Muscle Spasms.  Dispense: 30 tablet; Refill: 0    Improving, continue conservative therapy with activity modification, heat, NSAIDs prn. Will send muscle relaxer as well. He will let me know if symptoms worsen or do not improve.    4. Bronchitis  -     doxycycline (VIBRAMYCIN) 100 MG capsule; Take 1 capsule by mouth 2 (Two) Times a Day.  Dispense: 14 capsule; Refill: 0  Ongoing >1 week, now productive. Will treat with doxycycline 100mg BID x 7 days. Return if symptoms worsen or do not improve.    5. Seasonal allergic rhinitis, unspecified trigger          Plan of care reviewed with patient at the conclusion of today's visit. Education was provided regarding diagnosis and management.  Patient verbalizes understanding of and agreement with management plan.      Follow Up:   Return in about 6 months (around 11/16/2022) for Annual.          DO OSIEL Nunn RD  [facility] PRIMARY CARE  [id number] [facility] [phone]  Fax [phone]  Phone [phone]

## 2022-07-02 ENCOUNTER — APPOINTMENT (OUTPATIENT)
Dept: CT IMAGING | Facility: HOSPITAL | Age: 27
End: 2022-07-02

## 2022-07-02 ENCOUNTER — HOSPITAL ENCOUNTER (EMERGENCY)
Facility: HOSPITAL | Age: 27
Discharge: HOME | End: 2022-07-02
Attending: EMERGENCY MEDICINE | Admitting: EMERGENCY MEDICINE

## 2022-07-02 VITALS
OXYGEN SATURATION: 98 % | HEART RATE: 75 BPM | HEIGHT: 71 IN | TEMPERATURE: 97.5 F | DIASTOLIC BLOOD PRESSURE: 66 MMHG | RESPIRATION RATE: 16 BRPM | SYSTOLIC BLOOD PRESSURE: 108 MMHG | WEIGHT: 170 LBS | BODY MASS INDEX: 23.8 KG/M2

## 2022-07-02 DIAGNOSIS — R51.9 GENERALIZED HEADACHE: ICD-10-CM

## 2022-07-02 DIAGNOSIS — M47.816 OSTEOARTHRITIS OF LUMBAR SPINE, UNSPECIFIED SPINAL OSTEOARTHRITIS COMPLICATION STATUS: ICD-10-CM

## 2022-07-02 DIAGNOSIS — M51.36 DEGENERATIVE DISC DISEASE, LUMBAR: Primary | ICD-10-CM

## 2022-07-02 LAB
ALBUMIN SERPL-MCNC: 4.9 G/DL (ref 3.5–5.2)
ALBUMIN/GLOB SERPL: 2.5 G/DL
ALP SERPL-CCNC: 85 U/L (ref 39–117)
ALT SERPL W P-5'-P-CCNC: 31 U/L (ref 1–41)
ANION GAP SERPL CALCULATED.3IONS-SCNC: 11 MMOL/L (ref 5–15)
AST SERPL-CCNC: 19 U/L (ref 1–40)
BASOPHILS # BLD AUTO: 0.02 10*3/MM3 (ref 0–0.2)
BASOPHILS NFR BLD AUTO: 0.4 % (ref 0–1.5)
BILIRUB SERPL-MCNC: 1 MG/DL (ref 0–1.2)
BUN SERPL-MCNC: 13 MG/DL (ref 6–20)
BUN/CREAT SERPL: 10.4 (ref 7–25)
CALCIUM SPEC-SCNC: 9.3 MG/DL (ref 8.6–10.5)
CHLORIDE SERPL-SCNC: 104 MMOL/L (ref 98–107)
CO2 SERPL-SCNC: 26 MMOL/L (ref 22–29)
CREAT SERPL-MCNC: 1.25 MG/DL (ref 0.76–1.27)
DEPRECATED RDW RBC AUTO: 37.2 FL (ref 37–54)
EGFRCR SERPLBLD CKD-EPI 2021: 81.4 ML/MIN/1.73
EOSINOPHIL # BLD AUTO: 0.13 10*3/MM3 (ref 0–0.4)
EOSINOPHIL NFR BLD AUTO: 2.5 % (ref 0.3–6.2)
ERYTHROCYTE [DISTWIDTH] IN BLOOD BY AUTOMATED COUNT: 11.6 % (ref 12.3–15.4)
GLOBULIN UR ELPH-MCNC: 2 GM/DL
GLUCOSE SERPL-MCNC: 97 MG/DL (ref 65–99)
HCT VFR BLD AUTO: 50 % (ref 37.5–51)
HGB BLD-MCNC: 16.8 G/DL (ref 13–17.7)
HOLD SPECIMEN: NORMAL
IMM GRANULOCYTES # BLD AUTO: 0.02 10*3/MM3 (ref 0–0.05)
IMM GRANULOCYTES NFR BLD AUTO: 0.4 % (ref 0–0.5)
LYMPHOCYTES # BLD AUTO: 1.59 10*3/MM3 (ref 0.7–3.1)
LYMPHOCYTES NFR BLD AUTO: 31.1 % (ref 19.6–45.3)
MCH RBC QN AUTO: 29.3 PG (ref 26.6–33)
MCHC RBC AUTO-ENTMCNC: 33.6 G/DL (ref 31.5–35.7)
MCV RBC AUTO: 87.3 FL (ref 79–97)
MONOCYTES # BLD AUTO: 0.47 10*3/MM3 (ref 0.1–0.9)
MONOCYTES NFR BLD AUTO: 9.2 % (ref 5–12)
NEUTROPHILS NFR BLD AUTO: 2.88 10*3/MM3 (ref 1.7–7)
NEUTROPHILS NFR BLD AUTO: 56.4 % (ref 42.7–76)
NRBC BLD AUTO-RTO: 0 /100 WBC (ref 0–0.2)
PLATELET # BLD AUTO: 165 10*3/MM3 (ref 140–450)
PMV BLD AUTO: 10.2 FL (ref 6–12)
POTASSIUM SERPL-SCNC: 4.1 MMOL/L (ref 3.5–5.2)
PROT SERPL-MCNC: 6.9 G/DL (ref 6–8.5)
RBC # BLD AUTO: 5.73 10*6/MM3 (ref 4.14–5.8)
SODIUM SERPL-SCNC: 141 MMOL/L (ref 136–145)
WBC NRBC COR # BLD: 5.11 10*3/MM3 (ref 3.4–10.8)
WHOLE BLOOD HOLD COAG: NORMAL
WHOLE BLOOD HOLD SPECIMEN: NORMAL

## 2022-07-02 PROCEDURE — 72131 CT LUMBAR SPINE W/O DYE: CPT

## 2022-07-02 PROCEDURE — 72128 CT CHEST SPINE W/O DYE: CPT

## 2022-07-02 PROCEDURE — 96375 TX/PRO/DX INJ NEW DRUG ADDON: CPT

## 2022-07-02 PROCEDURE — 80053 COMPREHEN METABOLIC PANEL: CPT | Performed by: EMERGENCY MEDICINE

## 2022-07-02 PROCEDURE — 96374 THER/PROPH/DIAG INJ IV PUSH: CPT

## 2022-07-02 PROCEDURE — 70450 CT HEAD/BRAIN W/O DYE: CPT

## 2022-07-02 PROCEDURE — 72125 CT NECK SPINE W/O DYE: CPT

## 2022-07-02 PROCEDURE — 99283 EMERGENCY DEPT VISIT LOW MDM: CPT

## 2022-07-02 PROCEDURE — 85025 COMPLETE CBC W/AUTO DIFF WBC: CPT | Performed by: EMERGENCY MEDICINE

## 2022-07-02 PROCEDURE — 25010000002 DEXAMETHASONE SODIUM PHOSPHATE 100 MG/10ML SOLUTION: Performed by: EMERGENCY MEDICINE

## 2022-07-02 PROCEDURE — 25010000002 KETOROLAC TROMETHAMINE PER 15 MG: Performed by: EMERGENCY MEDICINE

## 2022-07-02 RX ORDER — METHYLPREDNISOLONE 4 MG/1
TABLET ORAL
Qty: 1 EACH | Refills: 0 | Status: SHIPPED | OUTPATIENT
Start: 2022-07-02 | End: 2022-07-06

## 2022-07-02 RX ORDER — SODIUM CHLORIDE 0.9 % (FLUSH) 0.9 %
10 SYRINGE (ML) INJECTION AS NEEDED
Status: DISCONTINUED | OUTPATIENT
Start: 2022-07-02 | End: 2022-07-02 | Stop reason: HOSPADM

## 2022-07-02 RX ORDER — KETOROLAC TROMETHAMINE 30 MG/ML
30 INJECTION, SOLUTION INTRAMUSCULAR; INTRAVENOUS ONCE
Status: COMPLETED | OUTPATIENT
Start: 2022-07-02 | End: 2022-07-02

## 2022-07-02 RX ORDER — NAPROXEN 500 MG/1
500 TABLET ORAL 2 TIMES DAILY WITH MEALS
Qty: 20 TABLET | Refills: 0 | Status: SHIPPED | OUTPATIENT
Start: 2022-07-02 | End: 2022-12-05

## 2022-07-02 RX ADMIN — DEXAMETHASONE SODIUM PHOSPHATE 10 MG: 10 INJECTION, SOLUTION INTRAMUSCULAR; INTRAVENOUS at 11:57

## 2022-07-02 RX ADMIN — KETOROLAC TROMETHAMINE 30 MG: 30 INJECTION, SOLUTION INTRAMUSCULAR at 11:37

## 2022-07-02 NOTE — ED PROVIDER NOTES
[place]    EMERGENCY DEPARTMENT ENCOUNTER      Pt Name: Marshal Kumar  MRN: [id number]  YOB: 1995  Date of evaluation: 7/2/2022  Provider: Da Azar DO    CHIEF COMPLAINT       Chief Complaint   Patient presents with   • Headache   • Neck Pain         HISTORY OF PRESENT ILLNESS  (Location/Symptom, Timing/Onset, Context/Setting, Quality, Duration, Modifying Factors, Severity.)   Marshal Kumar is a 26 y.o. male who presents to the emergency department for evaluation of intermittent back pain from an injury which occurred about a month ago.  He notes he had a very hard sneeze causing an injury to his upper back.  Had some radiation of the pain up until his neck, mild in nature but present.  Was just treated the patient symptomatically at home with anti-inflammatory medications, been doing well up until yesterday started noticing some neck pain which radiated up into the right occipital skull region noting an onset of a headache/migraine at that time.  He denies any other known injuries or trauma, has had some mild to head injuries in the past but no history of recurrent headaches or migraines.  He denies any fever, chills, no neck stiffness with flexion or extension but with side bending and rotation he notes that his very mildly uncomfortable, feels his headache may be radiating up from an injury in his thoracic or cervical neck.  No prior spine issues or surgeries or any neurological imaging in the past.  He denies any numbness or tingling in his upper or lower extremities, no difficulty with ambulation, no vision changes.  The patient does not have any significant medical history, no recent travel, no known sick contacts.  Denies any other acute systemic complaints at this time.      Nursing notes were reviewed.    REVIEW OF SYSTEMS    (2-9 systems for level 4, 10 or more for level 5)   ROS:  General:  No fevers, no chills, no weakness  Cardiovascular:  No chest pain, no  palpitations  Respiratory:  No shortness of breath, no cough, no wheezing  Gastrointestinal:  No pain, no nausea, no vomiting, no diarrhea  Musculoskeletal: Positive mid and upper back pain, generalized neck pain, no weakness, numbness or tingling.  Skin:  No rash  Neurologic:  No speech problems, + posterior occipital right-sided headache, no extremity numbness, no extremity tingling, no extremity weakness  Psychiatric:  No anxiety  Genitourinary:  No dysuria, no hematuria    Except as noted above the remainder of the review of systems was reviewed and negative.       PAST MEDICAL HISTORY     Past Medical History:   Diagnosis Date   • Kidney stones          SURGICAL HISTORY       Past Surgical History:   Procedure Laterality Date   • TONSILLECTOMY           CURRENT MEDICATIONS       Current Facility-Administered Medications:   •  sodium chloride 0.9 % flush 10 mL, 10 mL, Intravenous, PRN, Da Azar,     Current Outpatient Medications:   •  cyclobenzaprine (FLEXERIL) 10 MG tablet, Take 1 tablet by mouth 3 (Three) Times a Day As Needed for Muscle Spasms., Disp: 30 tablet, Rfl: 0  •  methylPREDNISolone (MEDROL) 4 MG dose pack, Take as directed on package instructions., Disp: 1 each, Rfl: 0  •  naproxen (Naprosyn) 500 MG tablet, Take 1 tablet by mouth 2 (Two) Times a Day With Meals., Disp: 20 tablet, Rfl: 0    ALLERGIES     Sulfa antibiotics and Amoxicillin    FAMILY HISTORY       Family History   Problem Relation Age of Onset   • Cancer Father         Currently diagnosed with colon cancer   • Miscarriages / Stillbirths Mother         Miscarriage          SOCIAL HISTORY       Social History     Socioeconomic History   • Marital status: Single   Tobacco Use   • Smoking status: Current Some Day Smoker     Types: Cigarettes   • Smokeless tobacco: Never Used   Vaping Use   • Vaping Use: Never used   Substance and Sexual Activity   • Alcohol use: Yes     Alcohol/week: 5.0 standard drinks     Types: 1 Glasses  "of wine, 3 Cans of beer, 1 Shots of liquor per week     Comment: A normal amount for a 26th year old   • Drug use: Never   • Sexual activity: Yes     Partners: Female         PHYSICAL EXAM    (up to 7 for level 4, 8 or more for level 5)     Vitals:    07/02/22 1002 07/02/22 1033 07/02/22 1128 07/02/22 1159   BP: 137/75 121/70 116/69 108/66   Patient Position: Lying      Pulse: 75      Resp: 16      Temp: 97.5 °F (36.4 °C)      TempSrc: Oral      SpO2: 97% 98% 98% 98%   Weight: 77.1 kg (170 lb)      Height: 180.3 cm (71\")          Physical Exam  General : Patient is awake, alert, oriented, in no acute distress, nontoxic appearing  HEENT: Pupils are equally round and reactive to light, EOMI, conjunctivae clear, sclerae white, there is no injection no icterus.  Oral mucosa is moist, no exudate. Uvula is midline  Neck: Neck is supple, full range of motion, trachea midline.  With right and left rotation there is mild discomfort on the paraspinal cervical musculature, there is no midline tenderness, step-off or deformity.  Kernig's and Brudzinski signs are negative, no meningeal signs on exam.  Cardiac: Heart regular rate, rhythm, no murmurs, rubs, or gallops  Lungs: Lungs are clear to auscultation, there is no wheezing, rhonchi, or rales. There is no use of accessory muscles  Chest wall: There is no tenderness to palpation over the chest wall or over ribs  Abdomen: Abdomen is soft, nontender, nondistended. There are no firm or pulsatile masses, no rebound rigidity or guarding.   Musculoskeletal: Tenderness to the right upper thoracic paraspinal musculature without any contusions or abrasions, no midline step-off or deformity throughout the cervical thoracic or lumbar spine.  5 out of 5 strength in all 4 extremities.  No focal muscle deficits are appreciated  Neuro: Motor intact, sensory intact, level of consciousness is normal, cerebellar function is normal, reflexes are grossly normal.  GCS 15, no focal neurological " deficit on examination.  Dermatology: Skin is warm and dry  Psych: Mentation is grossly normal, cognition is grossly normal. Affect is appropriate.      DIAGNOSTIC RESULTS     EKG: All EKGs are interpreted by the Emergency Department Physician who either signs or Co-signs this chart in the absence of a cardiologist.    No orders to display       RADIOLOGY:   Non-plain film images such as CT, Ultrasound and MRI are read by the radiologist. Plain radiographic images are visualized and preliminarily interpreted by the emergency physician with the below findings:      [] Radiologist's Report Reviewed:  CT Head Without Contrast   Final Result   1.  An acute intracranial abnormality is not appreciated.       This report was finalized on 7/2/2022 11:19 AM by Freddy Boyle MD.          CT Cervical Spine Without Contrast   Final Result   1.  Slight curvature cervical spine with convexity to left.       The need for follow-up should be based on clinical findings. Depending   on clinical findings MR at some point may be of benefit to reassess this   patient.       This report was finalized on 7/2/2022 11:21 AM by Freddy Boyle MD.          CT Thoracic Spine Without Contrast   Final Result   1.  An acute thoracic spine abnormality is not apparent on this exam.       Depending on clinical findings and the need for follow-up MR may be of   benefit at some point as thought clinically appropriate       This report was finalized on 7/2/2022 11:24 AM by Freddy Boyle MD.          CT Lumbar Spine Without Contrast   Final Result   1.  Multilevel degenerative change. At some point MR may be of benefit   to reassess.   2.  Nonobstructing right renal calculus.       This report was finalized on 7/2/2022 11:28 AM by Freddy Boyle MD.                ED BEDSIDE ULTRASOUND:   Performed by ED Physician - none    LABS:    I have reviewed and interpreted all of the currently available lab results from this visit (if applicable):  Results for orders  placed or performed during the hospital encounter of 07/02/22   Comprehensive Metabolic Panel    Specimen: Blood   Result Value Ref Range    Glucose 97 65 - 99 mg/dL    BUN 13 6 - 20 mg/dL    Creatinine 1.25 0.76 - 1.27 mg/dL    Sodium 141 136 - 145 mmol/L    Potassium 4.1 3.5 - 5.2 mmol/L    Chloride 104 98 - 107 mmol/L    CO2 26.0 22.0 - 29.0 mmol/L    Calcium 9.3 8.6 - 10.5 mg/dL    Total Protein 6.9 6.0 - 8.5 g/dL    Albumin 4.90 3.50 - 5.20 g/dL    ALT (SGPT) 31 1 - 41 U/L    AST (SGOT) 19 1 - 40 U/L    Alkaline Phosphatase 85 39 - 117 U/L    Total Bilirubin 1.0 0.0 - 1.2 mg/dL    Globulin 2.0 gm/dL    A/G Ratio 2.5 g/dL    BUN/Creatinine Ratio 10.4 7.0 - 25.0    Anion Gap 11.0 5.0 - 15.0 mmol/L    eGFR 81.4 >60.0 mL/min/1.73   CBC Auto Differential    Specimen: Blood   Result Value Ref Range    WBC 5.11 3.40 - 10.80 10*3/mm3    RBC 5.73 4.14 - 5.80 10*6/mm3    Hemoglobin 16.8 13.0 - 17.7 g/dL    Hematocrit 50.0 37.5 - 51.0 %    MCV 87.3 79.0 - 97.0 fL    MCH 29.3 26.6 - 33.0 pg    MCHC 33.6 31.5 - 35.7 g/dL    RDW 11.6 (L) 12.3 - 15.4 %    RDW-SD 37.2 37.0 - 54.0 fl    MPV 10.2 6.0 - 12.0 fL    Platelets 165 140 - 450 10*3/mm3    Neutrophil % 56.4 42.7 - 76.0 %    Lymphocyte % 31.1 19.6 - 45.3 %    Monocyte % 9.2 5.0 - 12.0 %    Eosinophil % 2.5 0.3 - 6.2 %    Basophil % 0.4 0.0 - 1.5 %    Immature Grans % 0.4 0.0 - 0.5 %    Neutrophils, Absolute 2.88 1.70 - 7.00 10*3/mm3    Lymphocytes, Absolute 1.59 0.70 - 3.10 10*3/mm3    Monocytes, Absolute 0.47 0.10 - 0.90 10*3/mm3    Eosinophils, Absolute 0.13 0.00 - 0.40 10*3/mm3    Basophils, Absolute 0.02 0.00 - 0.20 10*3/mm3    Immature Grans, Absolute 0.02 0.00 - 0.05 10*3/mm3    nRBC 0.0 0.0 - 0.2 /100 WBC   Green Top (Gel)   Result Value Ref Range    Extra Tube Hold for add-ons.    Lavender Top   Result Value Ref Range    Extra Tube hold for add-on    Gold Top - SST   Result Value Ref Range    Extra Tube Hold for add-ons.    Light Blue Top   Result Value Ref Range  "   Extra Tube Hold for add-ons.         All other labs were within normal range or not returned as of this dictation.      EMERGENCY DEPARTMENT COURSE and DIFFERENTIAL DIAGNOSIS/MDM:   Vitals:    Vitals:    07/02/22 1002 07/02/22 1033 07/02/22 1128 07/02/22 1159   BP: 137/75 121/70 116/69 108/66   Patient Position: Lying      Pulse: 75      Resp: 16      Temp: 97.5 °F (36.4 °C)      TempSrc: Oral      SpO2: 97% 98% 98% 98%   Weight: 77.1 kg (170 lb)      Height: 180.3 cm (71\")               Patient with upper back, neck pain from a remote injury over a month ago, has had some recurrence of his symptoms yesterday with some radiation of the pain to the head causing a headache, migrainous type symptoms.  He is nontoxic-appearing, does not have any meningeal signs on examination, he is afebrile.  Patient feels his headache is stemming from some type of injury in his cervical thoracic spine.  He does not have any weakness, no neurological deficit examination, no numbness or tingling.  No prior imaging.  We will move forward with symptomatic treatment, imaging of the head and spine.  Results as above.  Patient does have degenerative disease noted throughout the lumbar spine, both of the joints and through the disc with some mild foraminal narrowing, stenosis appreciated.  This is likely exacerbating his mid and upper back symptoms, causing some underlying headache.  There is no other acute abnormalities on imaging.  Operative patient on these results, given his young age, more advanced disease he would benefit from physical therapy, rehab, referral over through his PCP for back and spine specialist, MRI imaging as needed.  We will proceed forward with anti-inflammatories, steroids for a few days, we discussed the importance of continuing with his therapies and exercises in the meantime.  Return precautions discussed.    I had a discussion with the patient/family regarding diagnosis, diagnostic results, treatment plan, and " medications.  The patient/family indicated understanding of these instructions.  I spent adequate time at the bedside preceding discharge necessary to personally discuss the aftercare instructions, giving patient education, providing explanations of the results of our evaluations/findings, and my decision making to assure that the patient/family understand the plan of care.  Time was allotted to answer questions at that time and throughout the ED course.  Emphasis was placed on timely follow-up after discharge.  I also discussed the potential for the development of an acute emergent condition requiring further evaluation, admission, or even surgical intervention. I discussed that we found nothing during the visit today indicating the need for further workup, admission, or the presence of an unstable medical condition.  I encouraged the patient to return to the emergency department immediately for ANY concerns, worsening, new complaints, or if symptoms persist and unable to seek follow-up in a timely fashion.  The patient/family expressed understanding and agreement with this plan.  The patient will follow-up with their PCP in 1-2 days for reevaluation.       MEDICATIONS ADMINISTERED IN ED:  Medications   sodium chloride 0.9 % flush 10 mL (has no administration in time range)   ketorolac (TORADOL) injection 30 mg (30 mg Intravenous Given 7/2/22 1137)   dexamethasone (DECADRON) IVPB 10 mg (0 mg Intravenous Stopped 7/2/22 1202)       PROCEDURES:  Procedures    CRITICAL CARE TIME    Total Critical Care time was 0 minutes, excluding separately reportable procedures.   There was a high probability of clinically significant/life threatening deterioration in the patient's condition which required my urgent intervention.      FINAL IMPRESSION      1. Degenerative disc disease, lumbar    2. Generalized headache    3. Osteoarthritis of lumbar spine, unspecified spinal osteoarthritis complication status           DISPOSITION/PLAN     ED Disposition     ED Disposition   Discharge    Condition   Stable    Comment   --             PATIENT REFERRED TO:  Luci Casper DO  [address] [id number] [phone]    In 2 days  For reevaluation, referral to physical therapy, advanced imaging/MRI as needed    [facility] Emergency Department  [address] [address] [phone]    If symptoms worsen      DISCHARGE MEDICATIONS:     Medication List      START taking these medications    methylPREDNISolone 4 MG dose pack  Commonly known as: MEDROL  Take as directed on package instructions.     naproxen 500 MG tablet  Commonly known as: Naprosyn  Take 1 tablet by mouth 2 (Two) Times a Day With Meals.        CONTINUE taking these medications    cyclobenzaprine 10 MG tablet  Commonly known as: FLEXERIL  Take 1 tablet by mouth 3 (Three) Times a Day As Needed for Muscle Spasms.        STOP taking these medications    doxycycline 100 MG capsule  Commonly known as: VIBRAMYCIN           Where to Get Your Medications      These medications were sent to "[id number] - [place], [place] - [address] AT [facility] [address] - [phone] [facility] [phone]   [address], [facility] [address]    Phone: [phone]   · methylPREDNISolone 4 MG dose pack  · naproxen 500 MG tablet             Comment: Please note this report has been produced using speech recognition software.      Da Azar DO  Attending Emergency Physician               Da Azar DO  07/02/22 1221

## 2022-07-04 ENCOUNTER — HOSPITAL ENCOUNTER (EMERGENCY)
Facility: HOSPITAL | Age: 27
Discharge: HOME | End: 2022-07-04
Attending: EMERGENCY MEDICINE | Admitting: EMERGENCY MEDICINE

## 2022-07-04 VITALS
SYSTOLIC BLOOD PRESSURE: 118 MMHG | BODY MASS INDEX: 23.8 KG/M2 | WEIGHT: 170 LBS | OXYGEN SATURATION: 96 % | RESPIRATION RATE: 18 BRPM | HEART RATE: 86 BPM | TEMPERATURE: 97.5 F | HEIGHT: 71 IN | DIASTOLIC BLOOD PRESSURE: 72 MMHG

## 2022-07-04 DIAGNOSIS — M54.2 CERVICALGIA: ICD-10-CM

## 2022-07-04 DIAGNOSIS — R51.9 NONINTRACTABLE EPISODIC HEADACHE, UNSPECIFIED HEADACHE TYPE: Primary | ICD-10-CM

## 2022-07-04 LAB
ALBUMIN SERPL-MCNC: 4.4 G/DL (ref 3.5–5.2)
ALBUMIN/GLOB SERPL: 1.8 G/DL
ALP SERPL-CCNC: 78 U/L (ref 39–117)
ALT SERPL W P-5'-P-CCNC: 29 U/L (ref 1–41)
ANION GAP SERPL CALCULATED.3IONS-SCNC: 8 MMOL/L (ref 5–15)
AST SERPL-CCNC: 17 U/L (ref 1–40)
BASOPHILS # BLD AUTO: 0.02 10*3/MM3 (ref 0–0.2)
BASOPHILS NFR BLD AUTO: 0.2 % (ref 0–1.5)
BILIRUB SERPL-MCNC: 0.8 MG/DL (ref 0–1.2)
BUN SERPL-MCNC: 15 MG/DL (ref 6–20)
BUN/CREAT SERPL: 13 (ref 7–25)
CALCIUM SPEC-SCNC: 9.9 MG/DL (ref 8.6–10.5)
CHLORIDE SERPL-SCNC: 105 MMOL/L (ref 98–107)
CO2 SERPL-SCNC: 27 MMOL/L (ref 22–29)
CREAT SERPL-MCNC: 1.15 MG/DL (ref 0.76–1.27)
D-LACTATE SERPL-SCNC: 0.8 MMOL/L (ref 0.5–2)
DEPRECATED RDW RBC AUTO: 37.1 FL (ref 37–54)
EGFRCR SERPLBLD CKD-EPI 2021: 90 ML/MIN/1.73
EOSINOPHIL # BLD AUTO: 0.05 10*3/MM3 (ref 0–0.4)
EOSINOPHIL NFR BLD AUTO: 0.4 % (ref 0.3–6.2)
ERYTHROCYTE [DISTWIDTH] IN BLOOD BY AUTOMATED COUNT: 11.7 % (ref 12.3–15.4)
GLOBULIN UR ELPH-MCNC: 2.4 GM/DL
GLUCOSE SERPL-MCNC: 104 MG/DL (ref 65–99)
HCT VFR BLD AUTO: 45.6 % (ref 37.5–51)
HGB BLD-MCNC: 15.5 G/DL (ref 13–17.7)
IMM GRANULOCYTES # BLD AUTO: 0.04 10*3/MM3 (ref 0–0.05)
IMM GRANULOCYTES NFR BLD AUTO: 0.3 % (ref 0–0.5)
LYMPHOCYTES # BLD AUTO: 1.33 10*3/MM3 (ref 0.7–3.1)
LYMPHOCYTES NFR BLD AUTO: 11.6 % (ref 19.6–45.3)
MCH RBC QN AUTO: 29.4 PG (ref 26.6–33)
MCHC RBC AUTO-ENTMCNC: 34 G/DL (ref 31.5–35.7)
MCV RBC AUTO: 86.4 FL (ref 79–97)
MONOCYTES # BLD AUTO: 0.62 10*3/MM3 (ref 0.1–0.9)
MONOCYTES NFR BLD AUTO: 5.4 % (ref 5–12)
NEUTROPHILS NFR BLD AUTO: 82.1 % (ref 42.7–76)
NEUTROPHILS NFR BLD AUTO: 9.4 10*3/MM3 (ref 1.7–7)
NRBC BLD AUTO-RTO: 0 /100 WBC (ref 0–0.2)
PLATELET # BLD AUTO: 178 10*3/MM3 (ref 140–450)
PMV BLD AUTO: 10.2 FL (ref 6–12)
POTASSIUM SERPL-SCNC: 4.2 MMOL/L (ref 3.5–5.2)
PROT SERPL-MCNC: 6.8 G/DL (ref 6–8.5)
RBC # BLD AUTO: 5.28 10*6/MM3 (ref 4.14–5.8)
SODIUM SERPL-SCNC: 140 MMOL/L (ref 136–145)
WBC NRBC COR # BLD: 11.46 10*3/MM3 (ref 3.4–10.8)

## 2022-07-04 PROCEDURE — 85025 COMPLETE CBC W/AUTO DIFF WBC: CPT | Performed by: NURSE PRACTITIONER

## 2022-07-04 PROCEDURE — 83605 ASSAY OF LACTIC ACID: CPT | Performed by: NURSE PRACTITIONER

## 2022-07-04 PROCEDURE — 25010000002 DIPHENHYDRAMINE PER 50 MG: Performed by: NURSE PRACTITIONER

## 2022-07-04 PROCEDURE — 36415 COLL VENOUS BLD VENIPUNCTURE: CPT

## 2022-07-04 PROCEDURE — 25010000002 METOCLOPRAMIDE PER 10 MG: Performed by: NURSE PRACTITIONER

## 2022-07-04 PROCEDURE — 96374 THER/PROPH/DIAG INJ IV PUSH: CPT

## 2022-07-04 PROCEDURE — 96375 TX/PRO/DX INJ NEW DRUG ADDON: CPT

## 2022-07-04 PROCEDURE — 25010000002 KETOROLAC TROMETHAMINE PER 15 MG: Performed by: NURSE PRACTITIONER

## 2022-07-04 PROCEDURE — 25010000002 METHYLPREDNISOLONE PER 125 MG: Performed by: NURSE PRACTITIONER

## 2022-07-04 PROCEDURE — 80053 COMPREHEN METABOLIC PANEL: CPT | Performed by: NURSE PRACTITIONER

## 2022-07-04 PROCEDURE — 99283 EMERGENCY DEPT VISIT LOW MDM: CPT

## 2022-07-04 RX ORDER — DIPHENHYDRAMINE HYDROCHLORIDE 50 MG/ML
25 INJECTION INTRAMUSCULAR; INTRAVENOUS ONCE
Status: COMPLETED | OUTPATIENT
Start: 2022-07-04 | End: 2022-07-04

## 2022-07-04 RX ORDER — METHYLPREDNISOLONE SODIUM SUCCINATE 125 MG/2ML
125 INJECTION, POWDER, LYOPHILIZED, FOR SOLUTION INTRAMUSCULAR; INTRAVENOUS ONCE
Status: COMPLETED | OUTPATIENT
Start: 2022-07-04 | End: 2022-07-04

## 2022-07-04 RX ORDER — KETOROLAC TROMETHAMINE 15 MG/ML
15 INJECTION, SOLUTION INTRAMUSCULAR; INTRAVENOUS ONCE
Status: COMPLETED | OUTPATIENT
Start: 2022-07-04 | End: 2022-07-04

## 2022-07-04 RX ORDER — SODIUM CHLORIDE 0.9 % (FLUSH) 0.9 %
10 SYRINGE (ML) INJECTION AS NEEDED
Status: DISCONTINUED | OUTPATIENT
Start: 2022-07-04 | End: 2022-07-04 | Stop reason: HOSPADM

## 2022-07-04 RX ORDER — METOCLOPRAMIDE HYDROCHLORIDE 5 MG/ML
10 INJECTION INTRAMUSCULAR; INTRAVENOUS ONCE
Status: COMPLETED | OUTPATIENT
Start: 2022-07-04 | End: 2022-07-04

## 2022-07-04 RX ADMIN — METHYLPREDNISOLONE SODIUM SUCCINATE 125 MG: 125 INJECTION, POWDER, FOR SOLUTION INTRAMUSCULAR; INTRAVENOUS at 18:39

## 2022-07-04 RX ADMIN — METOCLOPRAMIDE 10 MG: 5 INJECTION, SOLUTION INTRAMUSCULAR; INTRAVENOUS at 18:39

## 2022-07-04 RX ADMIN — KETOROLAC TROMETHAMINE 15 MG: 15 INJECTION, SOLUTION INTRAMUSCULAR; INTRAVENOUS at 18:39

## 2022-07-04 RX ADMIN — SODIUM CHLORIDE 1000 ML: 9 INJECTION, SOLUTION INTRAVENOUS at 18:39

## 2022-07-04 RX ADMIN — DIPHENHYDRAMINE HYDROCHLORIDE 25 MG: 50 INJECTION, SOLUTION INTRAMUSCULAR; INTRAVENOUS at 18:39

## 2022-07-04 NOTE — ED PROVIDER NOTES
" EMERGENCY DEPARTMENT ENCOUNTER    Pt Name: Marshal Kumar  MRN: [id number]  Pt :   1995  Room Number:    Date of encounter:  2022  PCP: Luci Casper DO  ED Provider: ANGEL Giles    Historian: patient      HPI:  Chief Complaint: Neck pain and headache        Context: Marshal Kumar is a 26 y.o. male who presents to the ED c/o continued neck pain and headache for which she saw here on .  Patient has been taking steroids and muscle relaxers.  He had relief over the weekend but had resurgence of his discomfort while busy at work.  He states movement of his neck causes him to have a \"\"migraine\".    Review of systems is negative for fever chills or recent illness.  Negative for chest pain or cough or shortness of breath.  GI and  systems are negative.  No profound weakness, dizziness or syncope.  No neurosensory complaint or focal weakness      PAST MEDICAL HISTORY  Past Medical History:   Diagnosis Date   • Kidney stones          PAST SURGICAL HISTORY  Past Surgical History:   Procedure Laterality Date   • TONSILLECTOMY           FAMILY HISTORY  Family History   Problem Relation Age of Onset   • Cancer Father         Currently diagnosed with colon cancer   • Miscarriages / Stillbirths Mother         Miscarriage         SOCIAL HISTORY  Social History     Socioeconomic History   • Marital status: Single   Tobacco Use   • Smoking status: Current Some Day Smoker     Types: Cigarettes   • Smokeless tobacco: Never Used   Vaping Use   • Vaping Use: Never used   Substance and Sexual Activity   • Alcohol use: Yes     Alcohol/week: 5.0 standard drinks     Types: 1 Glasses of wine, 3 Cans of beer, 1 Shots of liquor per week     Comment: A normal amount for a 26th year old   • Drug use: Never   • Sexual activity: Yes     Partners: Female         ALLERGIES  Sulfa antibiotics and Amoxicillin        REVIEW OF SYSTEMS  Review of Systems     All systems reviewed and negative except for those discussed " in HPI.       PHYSICAL EXAM    I have reviewed the triage vital signs and nursing notes.    ED Triage Vitals [07/04/22 1648]   Temp Heart Rate Resp BP SpO2   97.5 °F (36.4 °C) 86 18 122/82 97 %      Temp src Heart Rate Source Patient Position BP Location FiO2 (%)   Oral Monitor Sitting Left arm --       Physical Exam  GENERAL:   Appears in no acute distress.  His vital signs are normal.  He is a very good historian  HENT: Nares patent.  Posterior pharynx is benign.  No local lymphadenopathy EYES: No scleral icterus.  CV: Regular rhythm, regular rate.  No tachycardia.  No peripheral edema  RESPIRATORY: Normal effort.  No audible wheezes, rales or rhonchi.  ABDOMEN: Soft, nontender  MUSCULOSKELETAL: No deformities.   NEURO: Alert, moves all extremities, follows commands.  No meningeal signs.  No neurosensory deficit or focal weakness.  SKIN: Warm, dry, no rash visualized.        LAB RESULTS  Recent Results (from the past 24 hour(s))   Comprehensive Metabolic Panel    Collection Time: 07/04/22  6:09 PM    Specimen: Arm, Right; Blood   Result Value Ref Range    Glucose 104 (H) 65 - 99 mg/dL    BUN 15 6 - 20 mg/dL    Creatinine 1.15 0.76 - 1.27 mg/dL    Sodium 140 136 - 145 mmol/L    Potassium 4.2 3.5 - 5.2 mmol/L    Chloride 105 98 - 107 mmol/L    CO2 27.0 22.0 - 29.0 mmol/L    Calcium 9.9 8.6 - 10.5 mg/dL    Total Protein 6.8 6.0 - 8.5 g/dL    Albumin 4.40 3.50 - 5.20 g/dL    ALT (SGPT) 29 1 - 41 U/L    AST (SGOT) 17 1 - 40 U/L    Alkaline Phosphatase 78 39 - 117 U/L    Total Bilirubin 0.8 0.0 - 1.2 mg/dL    Globulin 2.4 gm/dL    A/G Ratio 1.8 g/dL    BUN/Creatinine Ratio 13.0 7.0 - 25.0    Anion Gap 8.0 5.0 - 15.0 mmol/L    eGFR 90.0 >60.0 mL/min/1.73   Lactic Acid, Plasma    Collection Time: 07/04/22  6:09 PM    Specimen: Arm, Right; Blood   Result Value Ref Range    Lactate 0.8 0.5 - 2.0 mmol/L   CBC Auto Differential    Collection Time: 07/04/22  6:09 PM    Specimen: Arm, Right; Blood   Result Value Ref Range     WBC 11.46 (H) 3.40 - 10.80 10*3/mm3    RBC 5.28 4.14 - 5.80 10*6/mm3    Hemoglobin 15.5 13.0 - 17.7 g/dL    Hematocrit 45.6 37.5 - 51.0 %    MCV 86.4 79.0 - 97.0 fL    MCH 29.4 26.6 - 33.0 pg    MCHC 34.0 31.5 - 35.7 g/dL    RDW 11.7 (L) 12.3 - 15.4 %    RDW-SD 37.1 37.0 - 54.0 fl    MPV 10.2 6.0 - 12.0 fL    Platelets 178 140 - 450 10*3/mm3    Neutrophil % 82.1 (H) 42.7 - 76.0 %    Lymphocyte % 11.6 (L) 19.6 - 45.3 %    Monocyte % 5.4 5.0 - 12.0 %    Eosinophil % 0.4 0.3 - 6.2 %    Basophil % 0.2 0.0 - 1.5 %    Immature Grans % 0.3 0.0 - 0.5 %    Neutrophils, Absolute 9.40 (H) 1.70 - 7.00 10*3/mm3    Lymphocytes, Absolute 1.33 0.70 - 3.10 10*3/mm3    Monocytes, Absolute 0.62 0.10 - 0.90 10*3/mm3    Eosinophils, Absolute 0.05 0.00 - 0.40 10*3/mm3    Basophils, Absolute 0.02 0.00 - 0.20 10*3/mm3    Immature Grans, Absolute 0.04 0.00 - 0.05 10*3/mm3    nRBC 0.0 0.0 - 0.2 /100 WBC       If labs were ordered, I independently reviewed the results.        RADIOLOGY  No Radiology Exams Resulted Within Past 24 Hours    PROCEDURES    Procedures    No orders to display       MEDICATIONS GIVEN IN ER    Medications   sodium chloride 0.9 % flush 10 mL (has no administration in time range)   sodium chloride 0.9 % bolus 1,000 mL (0 mL Intravenous Stopped 7/4/22 1940)   ketorolac (TORADOL) injection 15 mg (15 mg Intravenous Given 7/4/22 1839)   metoclopramide (REGLAN) injection 10 mg (10 mg Intravenous Given 7/4/22 1839)   diphenhydrAMINE (BENADRYL) injection 25 mg (25 mg Intravenous Given 7/4/22 1839)   methylPREDNISolone sodium succinate (SOLU-Medrol) injection 125 mg (125 mg Intravenous Given 7/4/22 1839)       ED Course as of 07/04/22 2001 Mon Jul 04, 2022 1748 I reviewed the patient's electronic medical record from July 2 and conferred with Dr. Wetzel who recalls ' case well.  His vital signs are normal.  He has no meningeal signs.  I am persuaded that he has an exacerbation of the same problem today.  Will use  supportive care for this headache with migraine cocktail and discharged to close follow-up as planned. [MS]      ED Course User Index  [MS] Danay Mckeon APRN           AS OF 20:01 EDT VITALS:    BP - 118/72  HR - 86  TEMP - 97.5 °F (36.4 °C)  O2 SATS - 96%                  DIAGNOSIS  Final diagnoses:   Nonintractable episodic headache, unspecified headache type   Cervicalgia         DISPOSITION    DISCHARGE    Patient discharged in stable condition.    Reviewed implications of results, diagnosis, meds, responsibility to follow up, warning signs and symptoms of possible worsening, potential complications and reasons to return to ER.    Patient/Family voiced understanding of above instructions.    Discussed plan for discharge, as there is no emergent indication for admission.  Pt/family is agreeable and understands need for follow up and possible repeat testing.  Pt/family is aware that discharge does not mean that nothing is wrong but that it indicates no emergency is currently present that requires admission and they must continue care with follow-up as given below or with a physician of their choice.     FOLLOW-UP  Luci Casper,   [address] [facility] [address]  [phone]    Schedule an appointment as soon as possible for a visit   If symptoms worsen         Medication List      No changes were made to your prescriptions during this visit.                  Danay Mckeon APRN  07/04/22 2001

## 2022-07-04 NOTE — DISCHARGE INSTRUCTIONS
Call your primary care provider regarding follow-up surveillance and recovery and any outpatient MRI imaging or physical therapy necessary.  Continue current medications.  Thank you

## 2022-07-06 ENCOUNTER — OFFICE VISIT (OUTPATIENT)
Dept: FAMILY MEDICINE CLINIC | Facility: CLINIC | Age: 27
End: 2022-07-06

## 2022-07-06 VITALS
SYSTOLIC BLOOD PRESSURE: 122 MMHG | WEIGHT: 169 LBS | OXYGEN SATURATION: 99 % | HEIGHT: 71 IN | DIASTOLIC BLOOD PRESSURE: 80 MMHG | BODY MASS INDEX: 23.66 KG/M2 | HEART RATE: 78 BPM

## 2022-07-06 DIAGNOSIS — G89.29 CHRONIC BILATERAL LOW BACK PAIN WITHOUT SCIATICA: ICD-10-CM

## 2022-07-06 DIAGNOSIS — M54.50 CHRONIC BILATERAL LOW BACK PAIN WITHOUT SCIATICA: ICD-10-CM

## 2022-07-06 DIAGNOSIS — M53.9 MULTILEVEL DEGENERATIVE DISC DISEASE: Primary | ICD-10-CM

## 2022-07-06 DIAGNOSIS — M54.2 NECK PAIN: ICD-10-CM

## 2022-07-06 PROCEDURE — 99214 OFFICE O/P EST MOD 30 MIN: CPT | Performed by: STUDENT IN AN ORGANIZED HEALTH CARE EDUCATION/TRAINING PROGRAM

## 2022-07-06 NOTE — PROGRESS NOTES
Established Office Visit      Patient Name: Marshal Kumar  : 1995   MRN: [id number]   Care Team: Patient Care Team:  Luci Casper DO as PCP - General (Internal Medicine)    Chief Complaint:    Chief Complaint   Patient presents with   • Hospital Follow Up Visit     22 headache, back pain, stiffness in neck, still stiff can not move head, headaches are better        History of Present Illness: Marshal Kumar is a 26 y.o. male who is here today for ER follow up. He presented to [facility] ER 22 and 22 for upper back/neck pain and headache. CT Head and CT C/T/L spine were unrevealing for acute pathology. CT L spine did reveal multilevel DDD. . At his first ER visit, he was discharged with medrol dose pack and flexeril. Symptoms initially improved but then returned 2 days later which prompted his second visit. He was given migraine cocktail and methylprednisolone injection. Migraine resolved but MSK pain persists.     He experienced low back pain while working about 8 weeks ago, felt symptoms initially improved and then acutely worsened . He has noticed some small improvement with the steroids, NSAIDs, and muscle relaxer but still feels far from his baseline. Unable to turn neck in either direction without experiencing pain, unable to bend forward and complete house hold tasks without discomfort in low back. Denies numbness/tingling, saddle anesthesia, or incontinence.     Subjective      Review of Systems:   Review of Systems - See HPI    I have reviewed and the following portions of the patient's history were updated as appropriate: past family history, past medical history, past social history, past surgical history and problem list.    Medications:     Current Outpatient Medications:   •  cyclobenzaprine (FLEXERIL) 10 MG tablet, Take 1 tablet by mouth 3 (Three) Times a Day As Needed for Muscle Spasms., Disp: 30 tablet, Rfl: 0  •  naproxen (Naprosyn) 500 MG tablet, Take 1 tablet by mouth 2  "(Two) Times a Day With Meals., Disp: 20 tablet, Rfl: 0    Allergies:   Allergies   Allergen Reactions   • Sulfa Antibiotics Unknown - Low Severity   • Amoxicillin Hives       Objective     Physical Exam:  Vital Signs:   Vitals:    07/06/22 0813   BP: 122/80   Pulse: 78   SpO2: 99%   Weight: 76.7 kg (169 lb)   Height: 180.3 cm (71\")     Body mass index is 23.57 kg/m².     Physical Exam  Vitals reviewed.   Constitutional:       Appearance: Normal appearance. He is normal weight.   Cardiovascular:      Rate and Rhythm: Normal rate.   Pulmonary:      Effort: Pulmonary effort is normal. No respiratory distress.   Musculoskeletal:      Comments: Limited range of motion in left and right cervical rotation. Discomfort reproduced with rotation and flexion/extension. Low back pain discomfort reproduced with hip flexion. Strength and sensation intact.    Skin:     General: Skin is warm and dry.   Neurological:      Mental Status: He is alert.   Psychiatric:         Mood and Affect: Mood normal.         Behavior: Behavior normal.         Judgment: Judgment normal.         Assessment / Plan      Assessment/Plan:   Problems Addressed This Visit  Diagnoses and all orders for this visit:    1. Multilevel degenerative disc disease (Primary)  -     MRI Cervical Spine Without Contrast; Future  -     MRI Lumbar Spine Without Contrast; Future  -     Ambulatory Referral to Physical Therapy Evaluate and treat    2. Neck pain  -     MRI Cervical Spine Without Contrast; Future  -     Ambulatory Referral to Physical Therapy Evaluate and treat    3. Chronic bilateral low back pain without sciatica  -     MRI Lumbar Spine Without Contrast; Future    Continue conservative management with medrol dose pack, NSAIDs prn, and muscle relaxer. Referred to PT today. Reviewed CT imaging consistent with multilevel lumbar DDD and small disc protrusion. Will move forward with MRI for further evaluation and NS referral if indicated and if symptoms do not " improve with conservative management.       Plan of care reviewed with patient at the conclusion of today's visit. Education was provided regarding diagnosis and management.  Patient verbalizes understanding of and agreement with management plan.    Follow Up:   Return if symptoms worsen or fail to improve.        DO OSIEL Nunn RD  [facility]  [address]  [facility] [address]  Fax [phone]  Phone [phone]

## 2022-08-09 ENCOUNTER — TELEPHONE (OUTPATIENT)
Dept: FAMILY MEDICINE CLINIC | Facility: CLINIC | Age: 27
End: 2022-08-09

## 2022-08-09 NOTE — TELEPHONE ENCOUNTER
Caller: Waylon Kumar    Relationship: Self    Best call back number: [phone]    What orders are you requesting (i.e. lab or imaging): MRI    Additional notes: PATIENT CALLING ABOUT MRI AND PATIENT IS NEEDING AN MRI TO BE SENT TO INSURANCE FOR APPROVAL, LAST MRI WAS DENIED. PATIENT STATES ONE MRI WAS APPROVED AND THE OTHER WAS NOT APPROVED FOR SPECIFIC INFORMATION TO APPROVE THE OTHER MRI.    THE MRI NEEDED IS FOR LOWER LUMBAR(BACK) THE CERVICAL SPINE(NECK) WAS APPROVED.     PEER TO PEER PHONE NUMBER: [phone]    PATIENT IS ASKING IF WE CAN DO PEER TO PEER FOR INSURANCE COMPANY TO GET IT APPROVED

## 2022-08-10 ENCOUNTER — HOSPITAL ENCOUNTER (OUTPATIENT)
Dept: MRI IMAGING | Facility: HOSPITAL | Age: 27
Discharge: HOME | End: 2022-08-10
Admitting: STUDENT IN AN ORGANIZED HEALTH CARE EDUCATION/TRAINING PROGRAM

## 2022-08-10 ENCOUNTER — APPOINTMENT (OUTPATIENT)
Dept: MRI IMAGING | Facility: HOSPITAL | Age: 27
End: 2022-08-10

## 2022-08-10 DIAGNOSIS — M54.2 NECK PAIN: ICD-10-CM

## 2022-08-10 DIAGNOSIS — M53.9 MULTILEVEL DEGENERATIVE DISC DISEASE: ICD-10-CM

## 2022-08-10 PROCEDURE — 72141 MRI NECK SPINE W/O DYE: CPT

## 2022-08-16 NOTE — TELEPHONE ENCOUNTER
Insurance denied MRI because he has not completed 6 weeks of physical therapy trial and recent CT of the lumbar spine was already performed. He has been referred to physical therapy but referral was closed after unable to reach patient.      If he is unwilling/unable to do 6 week trial of physical therapy first and if symptoms have not improved/are worsening, I can go ahead and initiate a referral to spine specialist (neurosurgery). Since he recently had CT of his low back, this may be enough information to determine appropriate treatment options.      Let me know what he prefers. Thanks!     Hub can relay and document.

## 2022-08-16 NOTE — TELEPHONE ENCOUNTER
Called patient twice, LVM to call back, please relay message:    Insurance denied MRI because he has not completed 6 weeks of physical therapy trial and recent CT of the lumbar spine was already performed. He has been referred to physical therapy but referral was closed after unable to reach patient.     If he is unwilling/unable to do 6 week trial of physical therapy first and if symptoms have not improved/are worsening, I can go ahead and initiate a referral to spine specialist (neurosurgery). Since he recently had CT of his low back, this may be enough information to determine appropriate treatment options.     Let me know what he prefers. Thanks!

## 2022-08-17 NOTE — TELEPHONE ENCOUNTER
Attempted to contact, no answer. Left detailed voicemail relaying provider's message     Insurance denied MRI because he has not completed 6 weeks of physical therapy trial and recent CT of the lumbar spine was already performed. He has been referred to physical therapy but referral was closed after unable to reach patient.      If he is unwilling/unable to do 6 week trial of physical therapy first and if symptoms have not improved/are worsening, I can go ahead and initiate a referral to spine specialist (neurosurgery). Since he recently had CT of his low back, this may be enough information to determine appropriate treatment options.      Let me know what he prefers. Thanks!      Hub can relay and document.

## 2022-12-05 ENCOUNTER — OFFICE VISIT (OUTPATIENT)
Dept: FAMILY MEDICINE CLINIC | Facility: CLINIC | Age: 27
End: 2022-12-05

## 2022-12-05 VITALS
BODY MASS INDEX: 24.56 KG/M2 | HEART RATE: 72 BPM | WEIGHT: 175.4 LBS | DIASTOLIC BLOOD PRESSURE: 76 MMHG | SYSTOLIC BLOOD PRESSURE: 114 MMHG | HEIGHT: 71 IN | OXYGEN SATURATION: 99 %

## 2022-12-05 DIAGNOSIS — Z11.3 SCREENING EXAMINATION FOR STD (SEXUALLY TRANSMITTED DISEASE): ICD-10-CM

## 2022-12-05 DIAGNOSIS — Z00.00 ANNUAL PHYSICAL EXAM: Primary | ICD-10-CM

## 2022-12-05 DIAGNOSIS — Z23 IMMUNIZATION DUE: ICD-10-CM

## 2022-12-05 DIAGNOSIS — Z80.0 FAMILY HISTORY OF COLORECTAL CANCER: ICD-10-CM

## 2022-12-05 PROCEDURE — 87591 N.GONORRHOEAE DNA AMP PROB: CPT | Performed by: STUDENT IN AN ORGANIZED HEALTH CARE EDUCATION/TRAINING PROGRAM

## 2022-12-05 PROCEDURE — 87491 CHLMYD TRACH DNA AMP PROBE: CPT | Performed by: STUDENT IN AN ORGANIZED HEALTH CARE EDUCATION/TRAINING PROGRAM

## 2022-12-05 PROCEDURE — 90471 IMMUNIZATION ADMIN: CPT | Performed by: STUDENT IN AN ORGANIZED HEALTH CARE EDUCATION/TRAINING PROGRAM

## 2022-12-05 PROCEDURE — 90715 TDAP VACCINE 7 YRS/> IM: CPT | Performed by: STUDENT IN AN ORGANIZED HEALTH CARE EDUCATION/TRAINING PROGRAM

## 2022-12-05 PROCEDURE — 87661 TRICHOMONAS VAGINALIS AMPLIF: CPT | Performed by: STUDENT IN AN ORGANIZED HEALTH CARE EDUCATION/TRAINING PROGRAM

## 2022-12-05 PROCEDURE — 99395 PREV VISIT EST AGE 18-39: CPT | Performed by: STUDENT IN AN ORGANIZED HEALTH CARE EDUCATION/TRAINING PROGRAM

## 2022-12-05 NOTE — PROGRESS NOTES
Physical Exam     Patient Name: Waylon Kumar  : 1995   MRN: [id number]   Care Team: Patient Care Team:  Luci Casper DO as PCP - General (Internal Medicine)    Chief Complaint:    Chief Complaint   Patient presents with   • Annual Exam       History of Present Illness: Waylon Kumar is a 27 y.o. male who is presents today for annual healthcare maintenance. Overall, patient's health is described as good. He is working full time at [employer]. He has no acute complaints today. Feeling well. Denies tobacco use. Last cigarette was >1 year ago, he previously smoked socially (rarely). He is sexually active. Interested in STD screening. Denies symptoms or known exposure.    Reports father has colon cancer. Diagnosed at age 62. Patient wants early colonoscopy.      PHQ-2 Depression Screening  Little interest or pleasure in doing things? 0-->not at all   Feeling down, depressed, or hopeless? 0-->not at all   PHQ-2 Total Score 0       Health Maintenance   Topic Date Due   • HEPATITIS C SCREENING  Never done   • COVID-19 Vaccine (2 - Moderna series) 2022 (Originally 2021)   • INFLUENZA VACCINE  2023 (Originally 2022)   • TDAP/TD VACCINES (2 - Td or Tdap) 2032   • Pneumococcal Vaccine 0-64  Aged Out       Subjective      Review of Systems:   Review of Systems - See HPI    Past Medical History:   Past Medical History:   Diagnosis Date   • Kidney stones        Past Surgical History:   Past Surgical History:   Procedure Laterality Date   • TONSILLECTOMY         Family History:   Family History   Problem Relation Age of Onset   • Cancer Father         Currently diagnosed with colon cancer   • Miscarriages / Stillbirths Mother         Miscarriage       Social History:   Social History     Socioeconomic History   • Marital status: Single   Tobacco Use   • Smoking status: Former     Types: Cigarettes   • Smokeless tobacco: Never   Vaping Use   • Vaping Use: Never used  "  Substance and Sexual Activity   • Alcohol use: Yes     Alcohol/week: 5.0 standard drinks     Types: 1 Glasses of wine, 3 Cans of beer, 1 Shots of liquor per week     Comment: A normal amount for a 26th year old   • Drug use: Never   • Sexual activity: Yes     Partners: Female       Tobacco History:   Social History     Tobacco Use   Smoking Status Former   • Types: Cigarettes   Smokeless Tobacco Never       Medications:   No current outpatient medications on file.    Allergies:   Allergies   Allergen Reactions   • Sulfa Antibiotics Unknown - Low Severity   • Amoxicillin Hives       Past Medical History, Social History, Family History and Care Team were all reviewed with patient and updated as appropriate.       Objective     Physical Exam:  Vital Signs:   Vitals:    12/05/22 1216   BP: 114/76   Pulse: 72   SpO2: 99%   Weight: 79.6 kg (175 lb 6.4 oz)   Height: 180.3 cm (70.98\")     Body mass index is 24.48 kg/m².     Physical Exam  Vitals reviewed. Exam conducted with a chaperone present.   Constitutional:       Appearance: Normal appearance. He is not ill-appearing.   Cardiovascular:      Rate and Rhythm: Normal rate and regular rhythm.      Heart sounds: Normal heart sounds.   Pulmonary:      Effort: Pulmonary effort is normal. No respiratory distress.      Breath sounds: Normal breath sounds. No wheezing.   Genitourinary:     Penis: Normal.       Testes: Normal.   Skin:     General: Skin is warm and dry.   Neurological:      Mental Status: He is alert.   Psychiatric:         Mood and Affect: Mood normal.         Behavior: Behavior normal.         Judgment: Judgment normal.         Assessment / Plan      Assessment/Plan:   Problems Addressed This Visit  Diagnoses and all orders for this visit:    1. Annual physical exam (Primary)  -     Tdap Vaccine Greater Than or Equal To 8yo IM    2. Family history of colorectal cancer  -     Ambulatory Referral to Gastroenterology    3. Immunization due  -     Tdap Vaccine " Greater Than or Equal To 6yo IM    4. Screening examination for STD (sexually transmitted disease)  -     Chlamydia trachomatis, Neisseria gonorrhoeae, Trichomonas vaginalis, PCR - Swab, Urine, Clean Catch; Future        Healthcare Maintenance   Vaccines:  -covid, tdap, influenza due. Tdap due today. Politely declined covid and influenza.     Cancer Screening  -No age appropriate cancer screenings due at this time  -Father has history of colon cancer, diagnosed age 62. Patient will at least need to start screening at age 40. He requests referral to GI to discuss earlier screening than age 40. Referral placed today.    Other  -PHQ score 0  -Counseled on importance of maintaining healthy diet and routine exercise. Encouraged 150 min exercise per week.  -Tobacco cessation: not indicated   -Sexual Health: Counseled patient on importance of safe sex habits including barrier contraception to prevent STD transmission. STD screening today.  -Blood pressure is at goal <130/80  -Metabolic screening not indicated     Encouraged routine eye and dental exams.     Plan of care reviewed with patient at the conclusion of today's visit. Education was provided regarding diagnosis and management.  Patient verbalizes understanding of and agreement with management plan.    Follow Up:   Return in about 1 year (around 12/5/2023) for Annual.        DO OSIEL Nunn RD  [facility]  [address]  [facility] [address]  Fax [phone]  Phone [phone]

## 2022-12-07 LAB
C TRACH RRNA SPEC QL NAA+PROBE: NEGATIVE
N GONORRHOEA RRNA SPEC QL NAA+PROBE: NEGATIVE
T VAGINALIS RRNA SPEC QL NAA+PROBE: NEGATIVE

## 2024-02-11 ENCOUNTER — WALK IN (OUTPATIENT)
Dept: URGENT CARE | Age: 29
End: 2024-02-11
Attending: EMERGENCY MEDICINE

## 2024-02-11 VITALS
TEMPERATURE: 97.5 F | DIASTOLIC BLOOD PRESSURE: 61 MMHG | RESPIRATION RATE: 14 BRPM | SYSTOLIC BLOOD PRESSURE: 103 MMHG | WEIGHT: 170 LBS | HEART RATE: 70 BPM | OXYGEN SATURATION: 100 %

## 2024-02-11 DIAGNOSIS — M62.838 CERVICAL PARASPINAL MUSCLE SPASM: Primary | ICD-10-CM

## 2024-02-11 DIAGNOSIS — R51.9 ACUTE NONINTRACTABLE HEADACHE, UNSPECIFIED HEADACHE TYPE: ICD-10-CM

## 2024-02-11 DIAGNOSIS — M54.50 ACUTE MIDLINE LOW BACK PAIN WITHOUT SCIATICA: ICD-10-CM

## 2024-02-11 RX ORDER — NAPROXEN 500 MG/1
500 TABLET ORAL 2 TIMES DAILY WITH MEALS
Qty: 20 TABLET | Refills: 0 | Status: SHIPPED | OUTPATIENT
Start: 2024-02-11 | End: 2024-02-21

## 2024-02-11 RX ORDER — CHOLECALCIFEROL (VITAMIN D3) 1250 MCG
CAPSULE ORAL
COMMUNITY
Start: 2024-02-06

## 2024-02-11 RX ORDER — DEXTROAMPHETAMINE SACCHARATE, AMPHETAMINE ASPARTATE MONOHYDRATE, DEXTROAMPHETAMINE SULFATE AND AMPHETAMINE SULFATE 2.5; 2.5; 2.5; 2.5 MG/1; MG/1; MG/1; MG/1
10 CAPSULE, EXTENDED RELEASE ORAL
COMMUNITY

## 2024-02-11 RX ORDER — ACETAMINOPHEN 500 MG
1000 TABLET ORAL EVERY 6 HOURS PRN
COMMUNITY

## 2024-02-11 RX ORDER — CETIRIZINE HYDROCHLORIDE 10 MG/1
TABLET ORAL
COMMUNITY
Start: 2023-10-27

## 2024-02-11 RX ORDER — CYCLOBENZAPRINE HCL 5 MG
5 TABLET ORAL 3 TIMES DAILY PRN
Qty: 21 TABLET | Refills: 0 | Status: SHIPPED | OUTPATIENT
Start: 2024-02-11 | End: 2024-02-18

## 2024-02-11 ASSESSMENT — PAIN SCALES - GENERAL: PAINLEVEL: 6

## (undated) DEVICE — NEEDLE ELECTRODE: Brand: EDGE

## (undated) DEVICE — SOL  .9 1000ML BTL

## (undated) DEVICE — ZIMMER® STERILE DISPOSABLE TOURNIQUET CUFF WITH PLC, DUAL PORT, SINGLE BLADDER, 18 IN. (46 CM)

## (undated) DEVICE — SUTURE VICRYL 2-0 FS-1

## (undated) DEVICE — PADDING CAST SOFT ROLL 3IN

## (undated) DEVICE — UPPER EXTREMITY CDS-LF: Brand: MEDLINE INDUSTRIES, INC.

## (undated) DEVICE — KENDALL SCD EXPRESS SLEEVES, KNEE LENGTH, MEDIUM: Brand: KENDALL SCD

## (undated) DEVICE — PADDING CAST SOFT ROLL 2\" STER

## (undated) DEVICE — NON-ADHERENT STRIPS,OIL EMULSION: Brand: CURITY

## (undated) DEVICE — STERILE POLYISOPRENE POWDER-FREE SURGICAL GLOVES: Brand: PROTEXIS

## (undated) DEVICE — REM POLYHESIVE ADULT PATIENT RETURN ELECTRODE: Brand: VALLEYLAB

## (undated) DEVICE — #15 STERILE STAINLESS BLADE: Brand: STERILE STAINLESS BLADES

## (undated) DEVICE — GAMMEX® PI HYBRID SIZE 8.5, STERILE POWDER-FREE SURGICAL GLOVE, POLYISOPRENE AND NEOPRENE BLEND: Brand: GAMMEX

## (undated) DEVICE — GAUZE SPONGES,USP TYPE VII GAUZE, 12 PLY: Brand: CURITY

## (undated) DEVICE — SUTURE ETHILON 4-0 PS-2

## (undated) NOTE — MR AVS SNAPSHOT
[facility]  [place] [facility],  [address]  [address] [place] [address] [phone]               Thank you for choosing us for your health care visit with Gilberto Galvan DO.   We are glad to serve you and happy to provide you with this sum * Amphetamine-Dextroamphet ER 10 MG Cp24   Take 1 capsule (10 mg total) by mouth daily. What changed: You were already taking a medication with the same name, and this prescription was added. Make sure you understand how and when to take each.    Common

## (undated) NOTE — Clinical Note
[facility], [address] [facility], [address] [place] [address] [phone]        [facility]    Stimulant Agreement  The purpose of this Agreement is to prevent misunderstandings about certain medi legal controlled substances not prescribed to me. I promise to  let my physician know of any medications prescribed to me by another  provider after  the date of  this agreement.  Use of alcohol is contraindicated; if I drink alcohol, I promise to limit my Date      Time          Signature of Patient or Authorized Representative          (with relationship to the Patient)    The Patient/Authorized Representative has read this form or had it read to him/her, states that he/she understands this information and

## (undated) NOTE — ED AVS SNAPSHOT
Jadene Mcburney   MRN: [id number]    Department:  [facility] Emergency Department   Date of Visit:  12/27/2018           Disclosure     Insurance plans vary and the physician(s) referred by the ER may not be covered by your plan.  Please contac tell this physician (or your personal doctor if your instructions are to return to your personal doctor) about any new or lasting problems. The primary care or specialist physician will see patients referred from the [facility] Emergency Department.  Suh Gambino